# Patient Record
Sex: FEMALE | Race: BLACK OR AFRICAN AMERICAN | NOT HISPANIC OR LATINO | URBAN - METROPOLITAN AREA
[De-identification: names, ages, dates, MRNs, and addresses within clinical notes are randomized per-mention and may not be internally consistent; named-entity substitution may affect disease eponyms.]

---

## 2017-04-23 ENCOUNTER — EMERGENCY (EMERGENCY)
Facility: HOSPITAL | Age: 47
LOS: 1 days | Discharge: HOME | End: 2017-04-23
Attending: EMERGENCY MEDICINE
Payer: COMMERCIAL

## 2017-04-23 VITALS
HEART RATE: 70 BPM | SYSTOLIC BLOOD PRESSURE: 106 MMHG | TEMPERATURE: 99 F | RESPIRATION RATE: 16 BRPM | DIASTOLIC BLOOD PRESSURE: 68 MMHG | OXYGEN SATURATION: 99 %

## 2017-04-23 VITALS
HEART RATE: 82 BPM | DIASTOLIC BLOOD PRESSURE: 62 MMHG | SYSTOLIC BLOOD PRESSURE: 115 MMHG | WEIGHT: 164.91 LBS | TEMPERATURE: 99 F | HEIGHT: 59 IN | RESPIRATION RATE: 18 BRPM | OXYGEN SATURATION: 100 %

## 2017-04-23 LAB
ALBUMIN SERPL ELPH-MCNC: 3.4 G/DL — LOW (ref 3.5–5)
ALP SERPL-CCNC: 57 U/L — SIGNIFICANT CHANGE UP (ref 40–120)
ALT FLD-CCNC: 20 U/L DA — SIGNIFICANT CHANGE UP (ref 10–60)
ANION GAP SERPL CALC-SCNC: 7 MMOL/L — SIGNIFICANT CHANGE UP (ref 5–17)
AST SERPL-CCNC: 17 U/L — SIGNIFICANT CHANGE UP (ref 10–40)
BASOPHILS # BLD AUTO: 0.1 K/UL — SIGNIFICANT CHANGE UP (ref 0–0.2)
BASOPHILS NFR BLD AUTO: 1.7 % — SIGNIFICANT CHANGE UP (ref 0–2)
BILIRUB SERPL-MCNC: 0.2 MG/DL — SIGNIFICANT CHANGE UP (ref 0.2–1.2)
BUN SERPL-MCNC: 11 MG/DL — SIGNIFICANT CHANGE UP (ref 7–18)
CALCIUM SERPL-MCNC: 8 MG/DL — LOW (ref 8.4–10.5)
CHLORIDE SERPL-SCNC: 107 MMOL/L — SIGNIFICANT CHANGE UP (ref 96–108)
CHOLEST SERPL-MCNC: 205 MG/DL — HIGH (ref 10–199)
CK MB BLD-MCNC: <0.5 % — SIGNIFICANT CHANGE UP (ref 0–3.5)
CK MB BLD-MCNC: <0.5 % — SIGNIFICANT CHANGE UP (ref 0–3.5)
CK MB CFR SERPL CALC: <1 NG/ML — SIGNIFICANT CHANGE UP (ref 0–3.6)
CK MB CFR SERPL CALC: <1 NG/ML — SIGNIFICANT CHANGE UP (ref 0–3.6)
CK SERPL-CCNC: 204 U/L — SIGNIFICANT CHANGE UP (ref 21–215)
CK SERPL-CCNC: 205 U/L — SIGNIFICANT CHANGE UP (ref 21–215)
CO2 SERPL-SCNC: 25 MMOL/L — SIGNIFICANT CHANGE UP (ref 22–31)
CREAT SERPL-MCNC: 0.82 MG/DL — SIGNIFICANT CHANGE UP (ref 0.5–1.3)
EOSINOPHIL # BLD AUTO: 0.3 K/UL — SIGNIFICANT CHANGE UP (ref 0–0.5)
EOSINOPHIL NFR BLD AUTO: 5 % — SIGNIFICANT CHANGE UP (ref 0–6)
GLUCOSE SERPL-MCNC: 98 MG/DL — SIGNIFICANT CHANGE UP (ref 70–99)
HBA1C BLD-MCNC: 5.4 % — SIGNIFICANT CHANGE UP (ref 4–5.6)
HCT VFR BLD CALC: 36.4 % — SIGNIFICANT CHANGE UP (ref 34.5–45)
HGB BLD-MCNC: 11.7 G/DL — SIGNIFICANT CHANGE UP (ref 11.5–15.5)
LYMPHOCYTES # BLD AUTO: 2.9 K/UL — SIGNIFICANT CHANGE UP (ref 1–3.3)
LYMPHOCYTES # BLD AUTO: 43.6 % — SIGNIFICANT CHANGE UP (ref 13–44)
MCHC RBC-ENTMCNC: 28.5 PG — SIGNIFICANT CHANGE UP (ref 27–34)
MCHC RBC-ENTMCNC: 32.2 GM/DL — SIGNIFICANT CHANGE UP (ref 32–36)
MCV RBC AUTO: 88.3 FL — SIGNIFICANT CHANGE UP (ref 80–100)
MONOCYTES # BLD AUTO: 0.5 K/UL — SIGNIFICANT CHANGE UP (ref 0–0.9)
MONOCYTES NFR BLD AUTO: 7.5 % — SIGNIFICANT CHANGE UP (ref 2–14)
NEUTROPHILS # BLD AUTO: 2.8 K/UL — SIGNIFICANT CHANGE UP (ref 1.8–7.4)
NEUTROPHILS NFR BLD AUTO: 42.2 % — LOW (ref 43–77)
PLATELET # BLD AUTO: 338 K/UL — SIGNIFICANT CHANGE UP (ref 150–400)
POTASSIUM SERPL-MCNC: 4.2 MMOL/L — SIGNIFICANT CHANGE UP (ref 3.5–5.3)
POTASSIUM SERPL-SCNC: 4.2 MMOL/L — SIGNIFICANT CHANGE UP (ref 3.5–5.3)
PROT SERPL-MCNC: 7 G/DL — SIGNIFICANT CHANGE UP (ref 6–8.3)
RBC # BLD: 4.12 M/UL — SIGNIFICANT CHANGE UP (ref 3.8–5.2)
RBC # FLD: 11 % — SIGNIFICANT CHANGE UP (ref 10.3–14.5)
SODIUM SERPL-SCNC: 139 MMOL/L — SIGNIFICANT CHANGE UP (ref 135–145)
TROPONIN I SERPL-MCNC: <0.015 NG/ML — SIGNIFICANT CHANGE UP (ref 0–0.04)
TROPONIN I SERPL-MCNC: <0.015 NG/ML — SIGNIFICANT CHANGE UP (ref 0–0.04)
WBC # BLD: 6.8 K/UL — SIGNIFICANT CHANGE UP (ref 3.8–10.5)
WBC # FLD AUTO: 6.8 K/UL — SIGNIFICANT CHANGE UP (ref 3.8–10.5)

## 2017-04-23 PROCEDURE — 99284 EMERGENCY DEPT VISIT MOD MDM: CPT

## 2017-04-23 PROCEDURE — 71046 X-RAY EXAM CHEST 2 VIEWS: CPT

## 2017-04-23 PROCEDURE — 82465 ASSAY BLD/SERUM CHOLESTEROL: CPT

## 2017-04-23 PROCEDURE — 80053 COMPREHEN METABOLIC PANEL: CPT

## 2017-04-23 PROCEDURE — 85027 COMPLETE CBC AUTOMATED: CPT

## 2017-04-23 PROCEDURE — 93005 ELECTROCARDIOGRAM TRACING: CPT

## 2017-04-23 PROCEDURE — 83036 HEMOGLOBIN GLYCOSYLATED A1C: CPT

## 2017-04-23 PROCEDURE — 82550 ASSAY OF CK (CPK): CPT

## 2017-04-23 PROCEDURE — 84484 ASSAY OF TROPONIN QUANT: CPT

## 2017-04-23 PROCEDURE — 99284 EMERGENCY DEPT VISIT MOD MDM: CPT | Mod: 25

## 2017-04-23 PROCEDURE — 82553 CREATINE MB FRACTION: CPT

## 2017-04-23 PROCEDURE — 71020: CPT | Mod: 26

## 2017-04-23 RX ORDER — CLOPIDOGREL BISULFATE 75 MG/1
75 TABLET, FILM COATED ORAL DAILY
Qty: 0 | Refills: 0 | Status: DISCONTINUED | OUTPATIENT
Start: 2017-04-23 | End: 2017-04-27

## 2017-04-23 RX ORDER — NITROGLYCERIN 6.5 MG
0.4 CAPSULE, EXTENDED RELEASE ORAL ONCE
Qty: 0 | Refills: 0 | Status: COMPLETED | OUTPATIENT
Start: 2017-04-23 | End: 2017-04-23

## 2017-04-23 RX ADMIN — CLOPIDOGREL BISULFATE 75 MILLIGRAM(S): 75 TABLET, FILM COATED ORAL at 17:33

## 2017-04-23 RX ADMIN — Medication 0.4 MILLIGRAM(S): at 17:33

## 2017-04-23 NOTE — ED PROVIDER NOTE - NS ED MD SCRIBE ATTENDING SCRIBE SECTIONS
REVIEW OF SYSTEMS/HIV/VITAL SIGNS( Pullset)/PHYSICAL EXAM/RESULTS/HISTORY OF PRESENT ILLNESS/DISPOSITION/PAST MEDICAL/SURGICAL/SOCIAL HISTORY

## 2017-04-23 NOTE — ED PROVIDER NOTE - MEDICAL DECISION MAKING DETAILS
45 y/o F pt with very low risk chest pain x3 days. Will do two sets of cardiac enzymes, d/c for outpatient stress.

## 2017-04-23 NOTE — ED ADULT NURSE NOTE - OBJECTIVE STATEMENT
Pt states that she has been having chest pain for the past 3 days. The pain is back and has been persistent for the past 3 hours, pt denies sob, numbness or tingling on extremities, or back pain.

## 2017-04-23 NOTE — ED PROVIDER NOTE - MUSCULOSKELETAL, MLM
Spine appears normal, range of motion is not limited, no muscle or joint tenderness. No lower extremity swelling.

## 2017-04-23 NOTE — ED PROVIDER NOTE - OBJECTIVE STATEMENT
45 y/o F pt with no PMHx and no PSHx presents to ED c/o intermittent L-sided CP (non-radiating; not associated with deep inspiration, eating, exertion, or position) with associated mild lightheadedness x3 days. Pt denies SOB, nausea, diaphoresis, lower extremity swelling, hemoptysis, fever, chills, cough, or any other complaints. Pt also denies taking oral contraceptives, and denies PMHx or FHx of PE, DVT, CAD, HTN, smoking, or drug use/abuse. Allergies: Motrin (rash).

## 2017-04-23 NOTE — ED PROVIDER NOTE - CHPI ED SYMPTOMS NEG
no diaphoresis/no fever/no nausea/no chills/no shortness of breath/no cough/no lower extremity swelling, no hemoptysis

## 2017-04-27 DIAGNOSIS — R07.9 CHEST PAIN, UNSPECIFIED: ICD-10-CM

## 2017-06-09 ENCOUNTER — APPOINTMENT (OUTPATIENT)
Dept: HUMAN REPRODUCTION | Facility: CLINIC | Age: 47
End: 2017-06-09

## 2017-07-13 ENCOUNTER — APPOINTMENT (OUTPATIENT)
Dept: HUMAN REPRODUCTION | Facility: CLINIC | Age: 47
End: 2017-07-13

## 2017-07-19 ENCOUNTER — OUTPATIENT (OUTPATIENT)
Dept: OUTPATIENT SERVICES | Facility: HOSPITAL | Age: 47
LOS: 1 days | Discharge: HOME | End: 2017-07-19
Payer: COMMERCIAL

## 2017-07-19 ENCOUNTER — APPOINTMENT (OUTPATIENT)
Dept: HUMAN REPRODUCTION | Facility: CLINIC | Age: 47
End: 2017-07-19

## 2017-07-19 ENCOUNTER — RESULT REVIEW (OUTPATIENT)
Age: 47
End: 2017-07-19

## 2017-07-19 VITALS
HEART RATE: 75 BPM | SYSTOLIC BLOOD PRESSURE: 103 MMHG | TEMPERATURE: 98 F | DIASTOLIC BLOOD PRESSURE: 70 MMHG | OXYGEN SATURATION: 100 % | RESPIRATION RATE: 15 BRPM

## 2017-07-19 VITALS
SYSTOLIC BLOOD PRESSURE: 100 MMHG | HEART RATE: 65 BPM | RESPIRATION RATE: 16 BRPM | TEMPERATURE: 97 F | WEIGHT: 164.91 LBS | HEIGHT: 59 IN | DIASTOLIC BLOOD PRESSURE: 65 MMHG | OXYGEN SATURATION: 100 %

## 2017-07-19 DIAGNOSIS — N70.11 CHRONIC SALPINGITIS: ICD-10-CM

## 2017-07-19 DIAGNOSIS — D25.9 LEIOMYOMA OF UTERUS, UNSPECIFIED: ICD-10-CM

## 2017-07-19 DIAGNOSIS — D36.9 BENIGN NEOPLASM, UNSPECIFIED SITE: Chronic | ICD-10-CM

## 2017-07-19 DIAGNOSIS — Z98.890 OTHER SPECIFIED POSTPROCEDURAL STATES: Chronic | ICD-10-CM

## 2017-07-19 DIAGNOSIS — R52 PAIN, UNSPECIFIED: ICD-10-CM

## 2017-07-19 PROBLEM — N97.9 INFERTILITY, FEMALE: Status: ACTIVE | Noted: 2017-07-19

## 2017-07-19 PROCEDURE — 58561 HYSTEROSCOPY REMOVE MYOMA: CPT

## 2017-07-19 PROCEDURE — 88305 TISSUE EXAM BY PATHOLOGIST: CPT

## 2017-07-19 RX ORDER — ONDANSETRON 8 MG/1
4 TABLET, FILM COATED ORAL ONCE
Qty: 0 | Refills: 0 | Status: DISCONTINUED | OUTPATIENT
Start: 2017-07-19 | End: 2017-07-19

## 2017-07-19 RX ORDER — OXYCODONE AND ACETAMINOPHEN 5; 325 MG/1; MG/1
5-325 TABLET ORAL
Qty: 10 | Refills: 0 | Status: ACTIVE | COMMUNITY
Start: 2017-07-19 | End: 1900-01-01

## 2017-07-19 RX ORDER — AMOXICILLIN AND CLAVULANATE POTASSIUM 875; 125 MG/1; MG/1
875-125 TABLET, COATED ORAL
Qty: 14 | Refills: 0 | Status: ACTIVE | COMMUNITY
Start: 2017-07-19 | End: 1900-01-01

## 2017-07-19 RX ORDER — ESTRADIOL 2 MG/1
2 TABLET ORAL
Qty: 56 | Refills: 3 | Status: ACTIVE | COMMUNITY
Start: 2017-07-19 | End: 1900-01-01

## 2017-07-20 PROBLEM — D25.9 LEIOMYOMA OF UTERUS, UNSPECIFIED: Chronic | Status: ACTIVE | Noted: 2017-07-18

## 2017-07-23 ENCOUNTER — APPOINTMENT (OUTPATIENT)
Dept: HUMAN REPRODUCTION | Facility: CLINIC | Age: 47
End: 2017-07-23

## 2017-07-23 PROBLEM — N97.9 FEMALE INFERTILITY: Status: ACTIVE | Noted: 2017-07-23

## 2017-07-23 RX ORDER — ESTRADIOL 2 MG/1
2 TABLET ORAL
Qty: 28 | Refills: 1 | Status: ACTIVE | COMMUNITY
Start: 2017-07-23 | End: 1900-01-01

## 2017-07-24 ENCOUNTER — APPOINTMENT (OUTPATIENT)
Dept: HUMAN REPRODUCTION | Facility: CLINIC | Age: 47
End: 2017-07-24

## 2017-07-24 DIAGNOSIS — N97.9 FEMALE INFERTILITY, UNSPECIFIED: ICD-10-CM

## 2017-07-25 LAB — SURGICAL PATHOLOGY STUDY: SIGNIFICANT CHANGE UP

## 2017-08-09 ENCOUNTER — APPOINTMENT (OUTPATIENT)
Dept: HUMAN REPRODUCTION | Facility: CLINIC | Age: 47
End: 2017-08-09
Payer: COMMERCIAL

## 2017-08-09 PROCEDURE — 99211 OFF/OP EST MAY X REQ PHY/QHP: CPT | Mod: 25

## 2017-08-09 PROCEDURE — 76830 TRANSVAGINAL US NON-OB: CPT

## 2017-08-09 PROCEDURE — 36415 COLL VENOUS BLD VENIPUNCTURE: CPT

## 2017-08-09 RX ORDER — MEDROXYPROGESTERONE ACETATE 10 MG/1
10 TABLET ORAL DAILY
Qty: 7 | Refills: 0 | Status: ACTIVE | COMMUNITY
Start: 2017-08-09 | End: 1900-01-01

## 2017-08-29 ENCOUNTER — APPOINTMENT (OUTPATIENT)
Dept: HUMAN REPRODUCTION | Facility: CLINIC | Age: 47
End: 2017-08-29

## 2017-09-11 ENCOUNTER — APPOINTMENT (OUTPATIENT)
Dept: HUMAN REPRODUCTION | Facility: CLINIC | Age: 47
End: 2017-09-11
Payer: COMMERCIAL

## 2017-09-11 DIAGNOSIS — Z30.41 ENCOUNTER FOR SURVEILLANCE OF CONTRACEPTIVE PILLS: ICD-10-CM

## 2017-09-11 PROCEDURE — 99213 OFFICE O/P EST LOW 20 MIN: CPT | Mod: 25

## 2017-09-11 PROCEDURE — 76830 TRANSVAGINAL US NON-OB: CPT

## 2017-10-06 RX ORDER — DESOGESTREL AND ETHINYL ESTRADIOL 0.15-0.03
0.15-3 KIT ORAL
Qty: 3 | Refills: 0 | Status: ACTIVE | COMMUNITY
Start: 2017-09-11 | End: 1900-01-01

## 2017-10-11 ENCOUNTER — EMERGENCY (EMERGENCY)
Facility: HOSPITAL | Age: 47
LOS: 1 days | Discharge: HOME | End: 2017-10-11
Attending: EMERGENCY MEDICINE | Admitting: EMERGENCY MEDICINE
Payer: COMMERCIAL

## 2017-10-11 VITALS
HEART RATE: 79 BPM | TEMPERATURE: 98 F | RESPIRATION RATE: 17 BRPM | WEIGHT: 169.98 LBS | DIASTOLIC BLOOD PRESSURE: 72 MMHG | OXYGEN SATURATION: 100 % | SYSTOLIC BLOOD PRESSURE: 121 MMHG | HEIGHT: 59 IN

## 2017-10-11 DIAGNOSIS — D36.9 BENIGN NEOPLASM, UNSPECIFIED SITE: Chronic | ICD-10-CM

## 2017-10-11 DIAGNOSIS — Z88.6 ALLERGY STATUS TO ANALGESIC AGENT: ICD-10-CM

## 2017-10-11 DIAGNOSIS — K64.9 UNSPECIFIED HEMORRHOIDS: ICD-10-CM

## 2017-10-11 DIAGNOSIS — N93.9 ABNORMAL UTERINE AND VAGINAL BLEEDING, UNSPECIFIED: ICD-10-CM

## 2017-10-11 DIAGNOSIS — Z98.890 OTHER SPECIFIED POSTPROCEDURAL STATES: Chronic | ICD-10-CM

## 2017-10-11 PROCEDURE — 99283 EMERGENCY DEPT VISIT LOW MDM: CPT

## 2017-10-11 PROCEDURE — 99284 EMERGENCY DEPT VISIT MOD MDM: CPT

## 2017-10-11 RX ORDER — MAGNESIUM HYDROXIDE 400 MG/1
30 TABLET, CHEWABLE ORAL
Qty: 1 | Refills: 0 | OUTPATIENT
Start: 2017-10-11 | End: 2017-11-10

## 2017-10-11 RX ORDER — HYDROCORTISONE/PRAMOXINE 2.5 %-1 %
1 CREAM WITH APPLICATOR RECTAL
Qty: 1 | Refills: 0 | OUTPATIENT
Start: 2017-10-11 | End: 2017-10-25

## 2017-10-11 NOTE — ED PROVIDER NOTE - MEDICAL DECISION MAKING DETAILS
c/o tender hemorrhoid x3 days, reduces at home s/p sitz bath, pt has not had BM x4 days, not thrombosed, denies rectal pain, or not passing gas; discussed continuing sitz baths, stool softeners, using donut, scheduled IBU and rx cream with surgery follow up for removal as needed. Pt not acutely concerned about vaginal bleeding stated in triage as it is chronic and managed by gynecology. Will dc home with general surgery references.

## 2017-10-11 NOTE — ED PROVIDER NOTE - ATTENDING CONTRIBUTION TO CARE
Agree with NP Corine's assessment and plan. Pt instructed to f/u in Surgery clinic. Given MOM and proctofoam for comfort.

## 2017-10-11 NOTE — ED ADULT NURSE NOTE - OBJECTIVE STATEMENT
C/O HEMORRHOIDS C/O PAINFUL HEMORRHOIDS SINCE YESTERDAY. SEEN AND EXAMINED BY RYAN MOON. WITH EXTERNAL HEMORRHOIDS NON TROMBOSED.CASE DISCUSSED BY RYAN MOON WITH PATIENT

## 2017-10-11 NOTE — ED PROVIDER NOTE - OBJECTIVE STATEMENT
48 y/o female, pmhx hemorrhoids, uterine fibroids c/o painful hemorrhoid x3 days, hard stools (last BM x4 days ago) and vaginal bleeding (x1 pad per day) x2 mos s/p fibroid robotic surgery 7/2017. Pt denies abdominal pain, pelvic pain, dysuria, hematuria, flank pain, rectal bleeding, or any other concerns. Confirms gynecology follow-up 9/17 and good follow-up. Pt is not taking stool softeners, using OTC prep-H with minimal relief, states sitz baths reduces hemorrhoid but the hemorrhoid later returns.

## 2017-11-30 ENCOUNTER — APPOINTMENT (OUTPATIENT)
Dept: HUMAN REPRODUCTION | Facility: CLINIC | Age: 47
End: 2017-11-30
Payer: COMMERCIAL

## 2017-11-30 PROCEDURE — 99213 OFFICE O/P EST LOW 20 MIN: CPT | Mod: 25

## 2017-11-30 PROCEDURE — 76830 TRANSVAGINAL US NON-OB: CPT

## 2017-12-07 ENCOUNTER — APPOINTMENT (OUTPATIENT)
Dept: HUMAN REPRODUCTION | Facility: CLINIC | Age: 47
End: 2017-12-07

## 2017-12-13 ENCOUNTER — APPOINTMENT (OUTPATIENT)
Dept: HUMAN REPRODUCTION | Facility: CLINIC | Age: 47
End: 2017-12-13

## 2018-02-27 ENCOUNTER — EMERGENCY (EMERGENCY)
Facility: HOSPITAL | Age: 48
LOS: 1 days | Discharge: HOME | End: 2018-02-27
Attending: EMERGENCY MEDICINE | Admitting: INTERNAL MEDICINE
Payer: COMMERCIAL

## 2018-02-27 VITALS
HEART RATE: 72 BPM | OXYGEN SATURATION: 100 % | SYSTOLIC BLOOD PRESSURE: 112 MMHG | TEMPERATURE: 98 F | DIASTOLIC BLOOD PRESSURE: 70 MMHG | RESPIRATION RATE: 16 BRPM

## 2018-02-27 DIAGNOSIS — Z98.890 OTHER SPECIFIED POSTPROCEDURAL STATES: Chronic | ICD-10-CM

## 2018-02-27 DIAGNOSIS — D36.9 BENIGN NEOPLASM, UNSPECIFIED SITE: Chronic | ICD-10-CM

## 2018-02-27 DIAGNOSIS — R07.9 CHEST PAIN, UNSPECIFIED: ICD-10-CM

## 2018-02-27 LAB
ALBUMIN SERPL ELPH-MCNC: 4.2 G/DL — SIGNIFICANT CHANGE UP (ref 3.3–5)
ALP SERPL-CCNC: 50 U/L — SIGNIFICANT CHANGE UP (ref 40–120)
ALT FLD-CCNC: 11 U/L — SIGNIFICANT CHANGE UP (ref 4–33)
APTT BLD: 32 SEC — SIGNIFICANT CHANGE UP (ref 27.5–37.4)
AST SERPL-CCNC: 16 U/L — SIGNIFICANT CHANGE UP (ref 4–32)
BASOPHILS # BLD AUTO: 0.05 K/UL — SIGNIFICANT CHANGE UP (ref 0–0.2)
BASOPHILS NFR BLD AUTO: 0.6 % — SIGNIFICANT CHANGE UP (ref 0–2)
BILIRUB SERPL-MCNC: 0.3 MG/DL — SIGNIFICANT CHANGE UP (ref 0.2–1.2)
BUN SERPL-MCNC: 10 MG/DL — SIGNIFICANT CHANGE UP (ref 7–23)
CALCIUM SERPL-MCNC: 8.6 MG/DL — SIGNIFICANT CHANGE UP (ref 8.4–10.5)
CHLORIDE SERPL-SCNC: 102 MMOL/L — SIGNIFICANT CHANGE UP (ref 98–107)
CK MB BLD-MCNC: 1.2 — SIGNIFICANT CHANGE UP (ref 0–2.5)
CK MB BLD-MCNC: 2.44 NG/ML — SIGNIFICANT CHANGE UP (ref 1–4.7)
CK SERPL-CCNC: 196 U/L — HIGH (ref 25–170)
CO2 SERPL-SCNC: 25 MMOL/L — SIGNIFICANT CHANGE UP (ref 22–31)
CREAT SERPL-MCNC: 0.87 MG/DL — SIGNIFICANT CHANGE UP (ref 0.5–1.3)
D DIMER BLD IA.RAPID-MCNC: < 150 NG/ML — SIGNIFICANT CHANGE UP
EOSINOPHIL # BLD AUTO: 0.42 K/UL — SIGNIFICANT CHANGE UP (ref 0–0.5)
EOSINOPHIL NFR BLD AUTO: 5.2 % — SIGNIFICANT CHANGE UP (ref 0–6)
GLUCOSE SERPL-MCNC: 87 MG/DL — SIGNIFICANT CHANGE UP (ref 70–99)
HCG SERPL-ACNC: < 5 MIU/ML — SIGNIFICANT CHANGE UP
HCT VFR BLD CALC: 37 % — SIGNIFICANT CHANGE UP (ref 34.5–45)
HGB BLD-MCNC: 12.3 G/DL — SIGNIFICANT CHANGE UP (ref 11.5–15.5)
IMM GRANULOCYTES # BLD AUTO: 0.02 # — SIGNIFICANT CHANGE UP
IMM GRANULOCYTES NFR BLD AUTO: 0.2 % — SIGNIFICANT CHANGE UP (ref 0–1.5)
INR BLD: 0.95 — SIGNIFICANT CHANGE UP (ref 0.88–1.17)
LYMPHOCYTES # BLD AUTO: 4.02 K/UL — HIGH (ref 1–3.3)
LYMPHOCYTES # BLD AUTO: 49.8 % — HIGH (ref 13–44)
MCHC RBC-ENTMCNC: 29.5 PG — SIGNIFICANT CHANGE UP (ref 27–34)
MCHC RBC-ENTMCNC: 33.2 % — SIGNIFICANT CHANGE UP (ref 32–36)
MCV RBC AUTO: 88.7 FL — SIGNIFICANT CHANGE UP (ref 80–100)
MONOCYTES # BLD AUTO: 0.64 K/UL — SIGNIFICANT CHANGE UP (ref 0–0.9)
MONOCYTES NFR BLD AUTO: 7.9 % — SIGNIFICANT CHANGE UP (ref 2–14)
NEUTROPHILS # BLD AUTO: 2.93 K/UL — SIGNIFICANT CHANGE UP (ref 1.8–7.4)
NEUTROPHILS NFR BLD AUTO: 36.3 % — LOW (ref 43–77)
NRBC # FLD: 0 — SIGNIFICANT CHANGE UP
PLATELET # BLD AUTO: 319 K/UL — SIGNIFICANT CHANGE UP (ref 150–400)
PMV BLD: 9.8 FL — SIGNIFICANT CHANGE UP (ref 7–13)
POTASSIUM SERPL-MCNC: 4.4 MMOL/L — SIGNIFICANT CHANGE UP (ref 3.5–5.3)
POTASSIUM SERPL-SCNC: 4.4 MMOL/L — SIGNIFICANT CHANGE UP (ref 3.5–5.3)
PROT SERPL-MCNC: 7 G/DL — SIGNIFICANT CHANGE UP (ref 6–8.3)
PROTHROM AB SERPL-ACNC: 10.9 SEC — SIGNIFICANT CHANGE UP (ref 9.8–13.1)
RBC # BLD: 4.17 M/UL — SIGNIFICANT CHANGE UP (ref 3.8–5.2)
RBC # FLD: 11.7 % — SIGNIFICANT CHANGE UP (ref 10.3–14.5)
SODIUM SERPL-SCNC: 140 MMOL/L — SIGNIFICANT CHANGE UP (ref 135–145)
TROPONIN T SERPL-MCNC: < 0.06 NG/ML — SIGNIFICANT CHANGE UP (ref 0–0.06)
WBC # BLD: 8.08 K/UL — SIGNIFICANT CHANGE UP (ref 3.8–10.5)
WBC # FLD AUTO: 8.08 K/UL — SIGNIFICANT CHANGE UP (ref 3.8–10.5)

## 2018-02-27 PROCEDURE — 93010 ELECTROCARDIOGRAM REPORT: CPT

## 2018-02-27 PROCEDURE — 99285 EMERGENCY DEPT VISIT HI MDM: CPT | Mod: 25

## 2018-02-27 NOTE — ED PROVIDER NOTE - OBJECTIVE STATEMENT
47yF w/pmhx uterine fibroids sent in by cardiologist  for chest pain. Pt states her chest pain first began one week ago after taking diet pills phentermine (3 doses, once every other day). Pt states the chest pain is left sided, described as a tightness, constant, no relieving factors. +lightheadedness and exertional component to chest pain began today. Pt was seen by  in his office today, normal appearing bedside US, sent in for cardiac workup. Pt flew to [place] at the end of January. Pt denies shortness of breath, abd pain, n/v/d, headache, hx of cardiac disease, family hx of cardiac disease, leg pain or swelling, OCP use or any other concerns.  Pt with similar hx of chest pain after diet pills in May 2017, she had a stress test performed at that time which was normal.

## 2018-02-27 NOTE — H&P ADULT - NSHPLABSRESULTS_GEN_ALL_CORE
12.3   8.08  )-----------( 319      ( 27 Feb 2018 20:30 )             37.0     EKG: NSR Flat TIn V1, 66 BPM    02-27    140  |  102  |  10  ----------------------------<  87  4.4   |  25  |  0.87    Ca    8.6      27 Feb 2018 21:02    TPro  7.0  /  Alb  4.2  /  TBili  0.3  /  DBili  x   /  AST  16  /  ALT  11  /  AlkPhos  50  02-27    CARDIAC MARKERS ( 28 Feb 2018 03:00 )  x     / < 0.06 ng/mL / 179 u/L / 1.91 ng/mL / x      CARDIAC MARKERS ( 27 Feb 2018 21:02 )  x     / < 0.06 ng/mL / 196 u/L / 2.44 ng/mL / x

## 2018-02-27 NOTE — H&P ADULT - HISTORY OF PRESENT ILLNESS
48 y/o F with hx of uterine fibroids presents with chest pain x 1 week. Chest pain is intermittent, non radiating, and has been worsening. She was seen at Dr. Nieves's office and was sent ot ED for further eval. Chest pain started when she started taking diets pills: phentermine which she stopped after 3 doses because she developed chest pain. She cannot recall what makes the pain better or worse. Denies fever, chills, cough, falls, LOC, SOB ,abdominal pain, nausea, vomiting, melena, hematochezia, LE edema, calf tenderness or dysuria.

## 2018-02-27 NOTE — H&P ADULT - ATTENDING COMMENTS
Patient seen and examined on 02/27/18.   -48 yo F admitted with chest pain  -Admit to tele  -JOSE with 3 sets CE  -check d-dimer  -rule out PE if d-dimer positive  -if d-dimer negative, check NST in am    Steven Ortiz MD  [facility]  [address] [facility], [address]  [place] [address]  office: [phone]  pager: [phone]

## 2018-02-27 NOTE — ED PROVIDER NOTE - ATTENDING CONTRIBUTION TO CARE
I was physically present for the E/M service provided. I agree with above history, physical, and plan which I have reviewed and edited where appropriate. I was physically present for the key portions of the service provided.    Attending Exam - Dr. Sosa: GEN: well appearing, NAD  HEENT: +PERRL, EOMI  RESP: CTAB, no signs of respiratory distress CV: s1s2 RRR ABD: soft/non tender/non distended  MSK: no deformities / swelling, normal range of motion, spine grossly normal NEURO: alert, non focal exam SKIN: normal color / temperature / condition.    Attending MDM: will check labs including CE, d-dimer, if dimer is positive will check CTA, discuss with cardiology regarding admission.

## 2018-02-27 NOTE — ED ADULT TRIAGE NOTE - CHIEF COMPLAINT QUOTE
c/o chest pain with left arm tingling since last night, pain worsened this A.M., saw PMD today and was instructed to go to ED for evaluation.  Patient taking diet pills for weight loss.  Denies any SOB. PMH: fibroids

## 2018-02-27 NOTE — ED PROVIDER NOTE - PROGRESS NOTE DETAILS
HILARIO Marie: Spoke with , sent in from his office, would like pt to have a d-dimer to r/o PE and cardiac enzymes, is planning to admit for stress tomorrow. If d-dimer positive will CTa chest HILARIO Marie: Spoke with , sent in from his office, would like pt to have a d-dimer to r/o PE and cardiac enzymes, is planning to admit for stress tomorrow. If d-dimer positive will CTa chest otherwise can admit to tele under iLzb[facility]

## 2018-02-28 ENCOUNTER — TRANSCRIPTION ENCOUNTER (OUTPATIENT)
Age: 48
End: 2018-02-28

## 2018-02-28 VITALS
TEMPERATURE: 98 F | SYSTOLIC BLOOD PRESSURE: 110 MMHG | DIASTOLIC BLOOD PRESSURE: 62 MMHG | RESPIRATION RATE: 17 BRPM | OXYGEN SATURATION: 98 % | HEART RATE: 82 BPM

## 2018-02-28 DIAGNOSIS — Z29.9 ENCOUNTER FOR PROPHYLACTIC MEASURES, UNSPECIFIED: ICD-10-CM

## 2018-02-28 DIAGNOSIS — R07.9 CHEST PAIN, UNSPECIFIED: ICD-10-CM

## 2018-02-28 LAB
BASOPHILS # BLD AUTO: 0.06 K/UL — SIGNIFICANT CHANGE UP (ref 0–0.2)
BASOPHILS NFR BLD AUTO: 1.2 % — SIGNIFICANT CHANGE UP (ref 0–2)
BUN SERPL-MCNC: 11 MG/DL — SIGNIFICANT CHANGE UP (ref 7–23)
CALCIUM SERPL-MCNC: 8.5 MG/DL — SIGNIFICANT CHANGE UP (ref 8.4–10.5)
CHLORIDE SERPL-SCNC: 102 MMOL/L — SIGNIFICANT CHANGE UP (ref 98–107)
CHOLEST SERPL-MCNC: 206 MG/DL — HIGH (ref 120–199)
CK MB BLD-MCNC: 1.1 — SIGNIFICANT CHANGE UP (ref 0–2.5)
CK MB BLD-MCNC: 1.9 NG/ML — SIGNIFICANT CHANGE UP (ref 1–4.7)
CK MB BLD-MCNC: 1.91 NG/ML — SIGNIFICANT CHANGE UP (ref 1–4.7)
CK SERPL-CCNC: 179 U/L — HIGH (ref 25–170)
CK SERPL-CCNC: 180 U/L — HIGH (ref 25–170)
CO2 SERPL-SCNC: 22 MMOL/L — SIGNIFICANT CHANGE UP (ref 22–31)
CREAT SERPL-MCNC: 0.81 MG/DL — SIGNIFICANT CHANGE UP (ref 0.5–1.3)
EOSINOPHIL # BLD AUTO: 0.24 K/UL — SIGNIFICANT CHANGE UP (ref 0–0.5)
EOSINOPHIL NFR BLD AUTO: 4.7 % — SIGNIFICANT CHANGE UP (ref 0–6)
GLUCOSE SERPL-MCNC: 86 MG/DL — SIGNIFICANT CHANGE UP (ref 70–99)
HBA1C BLD-MCNC: 5.5 % — SIGNIFICANT CHANGE UP (ref 4–5.6)
HCG SERPL-ACNC: < 5 MIU/ML — SIGNIFICANT CHANGE UP
HCT VFR BLD CALC: 37.3 % — SIGNIFICANT CHANGE UP (ref 34.5–45)
HDLC SERPL-MCNC: 84 MG/DL — HIGH (ref 45–65)
HGB BLD-MCNC: 12.3 G/DL — SIGNIFICANT CHANGE UP (ref 11.5–15.5)
IMM GRANULOCYTES # BLD AUTO: 0.01 # — SIGNIFICANT CHANGE UP
IMM GRANULOCYTES NFR BLD AUTO: 0.2 % — SIGNIFICANT CHANGE UP (ref 0–1.5)
LIPID PNL WITH DIRECT LDL SERPL: 122 MG/DL — SIGNIFICANT CHANGE UP
LYMPHOCYTES # BLD AUTO: 2.42 K/UL — SIGNIFICANT CHANGE UP (ref 1–3.3)
LYMPHOCYTES # BLD AUTO: 47.8 % — HIGH (ref 13–44)
MAGNESIUM SERPL-MCNC: 2.2 MG/DL — SIGNIFICANT CHANGE UP (ref 1.6–2.6)
MCHC RBC-ENTMCNC: 29 PG — SIGNIFICANT CHANGE UP (ref 27–34)
MCHC RBC-ENTMCNC: 33 % — SIGNIFICANT CHANGE UP (ref 32–36)
MCV RBC AUTO: 88 FL — SIGNIFICANT CHANGE UP (ref 80–100)
MONOCYTES # BLD AUTO: 0.35 K/UL — SIGNIFICANT CHANGE UP (ref 0–0.9)
MONOCYTES NFR BLD AUTO: 6.9 % — SIGNIFICANT CHANGE UP (ref 2–14)
NEUTROPHILS # BLD AUTO: 1.98 K/UL — SIGNIFICANT CHANGE UP (ref 1.8–7.4)
NEUTROPHILS NFR BLD AUTO: 39.2 % — LOW (ref 43–77)
NRBC # FLD: 0 — SIGNIFICANT CHANGE UP
PHOSPHATE SERPL-MCNC: 2.9 MG/DL — SIGNIFICANT CHANGE UP (ref 2.5–4.5)
PLATELET # BLD AUTO: 295 K/UL — SIGNIFICANT CHANGE UP (ref 150–400)
PMV BLD: 9.5 FL — SIGNIFICANT CHANGE UP (ref 7–13)
POTASSIUM SERPL-MCNC: 4.7 MMOL/L — SIGNIFICANT CHANGE UP (ref 3.5–5.3)
POTASSIUM SERPL-SCNC: 4.7 MMOL/L — SIGNIFICANT CHANGE UP (ref 3.5–5.3)
RBC # BLD: 4.24 M/UL — SIGNIFICANT CHANGE UP (ref 3.8–5.2)
RBC # FLD: 11.8 % — SIGNIFICANT CHANGE UP (ref 10.3–14.5)
SODIUM SERPL-SCNC: 139 MMOL/L — SIGNIFICANT CHANGE UP (ref 135–145)
TRIGL SERPL-MCNC: 52 MG/DL — SIGNIFICANT CHANGE UP (ref 10–149)
TROPONIN T SERPL-MCNC: < 0.06 NG/ML — SIGNIFICANT CHANGE UP (ref 0–0.06)
TROPONIN T SERPL-MCNC: < 0.06 NG/ML — SIGNIFICANT CHANGE UP (ref 0–0.06)
TSH SERPL-MCNC: 0.88 UIU/ML — SIGNIFICANT CHANGE UP (ref 0.27–4.2)
WBC # BLD: 5.06 K/UL — SIGNIFICANT CHANGE UP (ref 3.8–10.5)
WBC # FLD AUTO: 5.06 K/UL — SIGNIFICANT CHANGE UP (ref 3.8–10.5)

## 2018-02-28 RX ORDER — SODIUM CHLORIDE 9 MG/ML
3 INJECTION INTRAMUSCULAR; INTRAVENOUS; SUBCUTANEOUS EVERY 8 HOURS
Qty: 0 | Refills: 0 | Status: DISCONTINUED | OUTPATIENT
Start: 2018-02-28 | End: 2018-02-27

## 2018-02-28 RX ADMIN — SODIUM CHLORIDE 3 MILLILITER(S): 9 INJECTION INTRAMUSCULAR; INTRAVENOUS; SUBCUTANEOUS at 08:00

## 2018-02-28 NOTE — DISCHARGE NOTE ADULT - CARE PLAN
Principal Discharge DX:	Atypical chest pain  Goal:	Your stress test was negative. Your chest pain is not cardiac.  Assessment and plan of treatment:	Follow up with your PCP and/or cardiologist for further care. Return to ED for any concerning symptoms.

## 2018-02-28 NOTE — CONSULT NOTE ADULT - ASSESSMENT
46 y/o F with hx of uterine fibroids presents with chest pain x 1 week.    Problem/Plan - 1:  ·  Problem: Chest pain.  Plan: telemonitor  check CE  cards fu  ischemia moreno as per cards    Problem/Plan - 2:  ·  Problem: Need for prophylactic measure.  Plan: LE stockings.     dc planning as per primary team

## 2018-02-28 NOTE — CONSULT NOTE ADULT - SUBJECTIVE AND OBJECTIVE BOX
Requesting Physician : Dr. Hagen    Reason for Consultation: chest pain/CAD    HISTORY OF PRESENT ILLNESS: HPI: 48 year old with pmhx of fibroids admitted to hospital with one day history of progessive dyspnea/chest pain radiating to left shoulder.  patient descirbes pain as being intermittent/associated with dyspnea/worse with exertion/ occassionally with palpation.  denies any palpitations/near synocpal episodes.  patient states that she has been taking fen-fen past few days prior to onset of chest pain.        PAST MEDICAL & SURGICAL HISTORY:  Fibroids  Dermoid cyst: left ovary 1998  History of myomectomy          MEDICATIONS:  MEDICATIONS  (STANDING):      Allergies    Motrin (Rash)    Intolerances        FAMILY HISTORY:    Non-contributary for premature coronary disease or sudden cardiac death    SOCIAL HISTORY:    [y Non-smoker  [ ] Smoker  [o ] Alcohol      REVIEW OF SYSTEMS:  [y ]chest pain  [y  ]shortness of breath  [ n ]palpitations  [n  ]syncope  [ n]near syncope [y ]upper extremity weakness   [ n] lower extremity weakness  [n  ]diplopia  [  n]altered mental status         [ ] All others negative	  [ ] Unable to obtain    PHYSICAL EXAM:  T(C): 36.8 (02-27-18 @ 21:32), Max: 36.8 (02-27-18 @ 21:32)  HR: 74 (02-27-18 @ 21:32) (72 - 74)  BP: 100/60 (02-27-18 @ 21:32) (100/60 - 112/70)  RR: 16 (02-27-18 @ 21:32) (16 - 16)  SpO2: 100% (02-27-18 @ 21:32) (100% - 100%)  Wt(kg): --  I&O's Summary        HEENT:   Normal oral mucosa, PERRL, EOMI	  Lymphatic: No obvious lymphadenopathy , no edema  Cardiovascular: Normal S1 S2, No JVD,  1/6 CANDICE murmur , Peripheral pulses palpable 2+ bilaterally  Respiratory: Lungs clear to auscultation, normal effort 	  Gastrointestinal:  Soft, Non-tender, + BS	  Skin: No rashes, No cyanosis, warm to touch  Musculoskeletal: Normal range of motion, normal strength  Psychiatry:  Appropriate Mood & affect     TELEMETRY: 	NSR    ECG:  	NSR /nonspeicific st/t wave changes    	  LABS:	 	    CARDIAC MARKERS:  CARDIAC MARKERS ( 27 Feb 2018 21:02 )  x     / < 0.06 ng/mL / 196 u/L / 2.44 ng/mL / x                                  12.3   8.08  )-----------( 319      ( 27 Feb 2018 20:30 )             37.0     Hb Trend: 12.3<--  02-27    140  |  102  |  10  ----------------------------<  87  4.4   |  25  |  0.87    Ca    8.6      27 Feb 2018 21:02    TPro  7.0  /  Alb  4.2  /  TBili  0.3  /  DBili  x   /  AST  16  /  ALT  11  /  AlkPhos  50  02-27    Creatinine Trend: 0.87<--    Coags:  PT/INR - ( 27 Feb 2018 20:30 )   PT: 10.9 SEC;   INR: 0.95          PTT - ( 27 Feb 2018 20:30 )  PTT:32.0 SEC    ASSESSMENT/PLAN: 	47yFemale with pmhx of htn admitted with chest pain associated with dsypnea after taking fen-fen worse with exertion.  1) agree with tele  2) r/Ame with CE  3) d-dimer--if elevated, will need ct chest to rule out PE  4) TST if d-dimer negative and CE negative  5) no need for urgent cath  6) cath if stresss positive  7) no need for iv heparin at this time
cc chest pain  [place] 46 y/o F with hx of uterine fibroids presents with chest pain x 1 week. Chest pain is intermittent, non radiating, and has been worsening. She was seen at Dr. Nieves's office and was sent ot ED for further eval. Chest pain started when she started taking diets pills: phentermine which she stopped after 3 doses because she developed chest pain. She cannot recall what makes the pain better or worse. Denies fever, chills, cough, falls, LOC, SOB ,abdominal pain, nausea, vomiting, melena, hematochezia, LE edema, calf tenderness or dysuria.    Allegies motrin    REVIEW OF SYSTEMS:    CONSTITUTIONAL: No weakness, fevers or chills  EYES/ENT: No visual changes;  No vertigo or throat pain   NECK: No pain or stiffness  RESPIRATORY: No cough, wheezing, hemoptysis; No shortness of breath  CARDIOVASCULAR: No chest pain or palpitations  GASTROINTESTINAL: No abdominal or epigastric pain. No nausea, vomiting, or hematemesis; No diarrhea or constipation. No melena or hematochezia.  GENITOURINARY: No dysuria, frequency or hematuria  NEUROLOGICAL: No numbness or weakness  SKIN: No itching, burning, rashes, or lesions   All other review of systems is negative unless indicated above.    Home Medications:   * Patient Currently Takes Medications as of 11-Oct-2017 14:41 documented in Structured Notes  · 	Milk of Magnesia 8% oral suspension: 30 milliliter(s) orally once a day, As Needed MDD:max 60mL/24h  · 	Proctofoam HC 1%-1% rectal foam: 1 applicatorful rectal 3 times a day  · 	predniSONE 20 mg oral tablet: 2 tab(s) orally once a day for 4 more days  · 	Tylenol:  orally     Patient History:   Past Medical History:  Fibroids.    Past Surgical History:  Dermoid cyst  left ovary 1998  History of myomectomy.    Family History:  No pertinent family history in first degree relatives.    Social History:  Social History (marital status, living situation, occupation, tobacco use, alcohol and drug use, and sexual history): Denies smoking, alcohol or drug use	      Physical exam    General: WN/WD NAD  PERRLA  Neurology: A&Ox3, nonfocal, WILKINSON x 4  Respiratory: CTA B/L  CV: RRR, S1S2, no murmurs, rubs or gallops  Abdominal: Soft, NT, ND +BS, Last BM  Extremities: No edema, + peripheral pulses  Skin Normal     Lab Results:  CBC  CBC Full  -  ( 28 Feb 2018 11:38 )  WBC Count : 5.06 K/uL  Hemoglobin : 12.3 g/dL  Hematocrit : 37.3 %  Platelet Count - Automated : 295 K/uL  Mean Cell Volume : 88.0 fL  Mean Cell Hemoglobin : 29.0 pg  Mean Cell Hemoglobin Concentration : 33.0 %  Auto Neutrophil # : 1.98 K/uL  Auto Lymphocyte # : 2.42 K/uL  Auto Monocyte # : 0.35 K/uL  Auto Eosinophil # : 0.24 K/uL  Auto Basophil # : 0.06 K/uL  Auto Neutrophil % : 39.2 %  Auto Lymphocyte % : 47.8 %  Auto Monocyte % : 6.9 %  Auto Eosinophil % : 4.7 %  Auto Basophil % : 1.2 %    .		Differential:	[] Automated		[] Manual  Chemistry                        12.3   5.06  )-----------( 295      ( 28 Feb 2018 11:38 )             37.3     02-28    139  |  102  |  11  ----------------------------<  86  4.7   |  22  |  0.81    Ca    8.5      28 Feb 2018 11:38  Phos  2.9     02-28  Mg     2.2     02-28    TPro  7.0  /  Alb  4.2  /  TBili  0.3  /  DBili  x   /  AST  16  /  ALT  11  /  AlkPhos  50  02-27    LIVER FUNCTIONS - ( 27 Feb 2018 21:02 )  Alb: 4.2 g/dL / Pro: 7.0 g/dL / ALK PHOS: 50 u/L / ALT: 11 u/L / AST: 16 u/L / GGT: x           PT/INR - ( 27 Feb 2018 20:30 )   PT: 10.9 SEC;   INR: 0.95          PTT - ( 27 Feb 2018 20:30 )  PTT:32.0 SEC          MICROBIOLOGY/CULTURES:      RADIOLOGY RESULTS: reviewed

## 2018-02-28 NOTE — DISCHARGE NOTE ADULT - CARE PROVIDER_API CALL
Steven Ortiz), Cardiovascular Disease; Internal Medicine; Interventional Cardiology  [address] [facility] [address] [place] [address]  Phone: [phone]  Fax: [phone]

## 2018-02-28 NOTE — DISCHARGE NOTE ADULT - PLAN OF CARE
Your stress test was negative. Your chest pain is not cardiac. Follow up with your PCP and/or cardiologist for further care. Return to ED for any concerning symptoms.

## 2018-02-28 NOTE — PROGRESS NOTE ADULT - SUBJECTIVE AND OBJECTIVE BOX
Subjective: No chest pain or sob   	  MEDICATIONS:  MEDICATIONS  (STANDING):  sodium chloride 0.9% lock flush 3 milliLiter(s) IV Push every 8 hours      LABS:	 	    CARDIAC MARKERS:  CARDIAC MARKERS ( 28 Feb 2018 11:38 )  x     / < 0.06 ng/mL / 180 u/L / 1.90 ng/mL / x      CARDIAC MARKERS ( 28 Feb 2018 03:00 )  x     / < 0.06 ng/mL / 179 u/L / 1.91 ng/mL / x      CARDIAC MARKERS ( 27 Feb 2018 21:02 )  x     / < 0.06 ng/mL / 196 u/L / 2.44 ng/mL / x                                    12.3   5.06  )-----------( 295      ( 28 Feb 2018 11:38 )             37.3     02-28    139  |  102  |  11  ----------------------------<  86  4.7   |  22  |  0.81    Ca    8.5      28 Feb 2018 11:38  Phos  2.9     02-28  Mg     2.2     02-28    TPro  7.0  /  Alb  4.2  /  TBili  0.3  /  DBili  x   /  AST  16  /  ALT  11  /  AlkPhos  50  02-27    proBNP:   Lipid Profile:   HgA1c: Hemoglobin A1C, Whole Blood: 5.5 % (02-28 @ 11:38)    TSH: Thyroid Stimulating Hormone, Serum: 0.88 uIU/mL (02-28 @ 11:38)        PHYSICAL EXAM:  T(C): 36.8 (02-28-18 @ 11:06), Max: 36.9 (02-28-18 @ 06:28)  HR: 82 (02-28-18 @ 11:06) (72 - 84)  BP: 110/62 (02-28-18 @ 11:06) (100/60 - 117/71)  RR: 17 (02-28-18 @ 11:06) (16 - 18)  SpO2: 98% (02-28-18 @ 11:06) (98% - 100%)  Wt(kg): --  I&O's Summary        Appearance: Normal	  HEENT:   Normal oral mucosa, PERRL, EOMI	  Lymphatic: No lymphadenopathy , no edema  Cardiovascular: Normal S1 S2, No JVD, No murmurs , Peripheral pulses palpable 2+ bilaterally  Respiratory: Lungs clear to auscultation, normal effort 	  Gastrointestinal:  Soft, Non-tender, + BS	  Skin: No rashes, No ecchymoses, No cyanosis, warm to touch  Musculoskeletal: Normal range of motion, normal strength  Psychiatry:  Mood & affect appropriate    TELEMETRY: SR	    ECG:  	  RADIOLOGY:   DIAGNOSTIC TESTING:  [ ] Echocardiogram:  [ ]  Catheterization:  [ ] Stress Test:  < from: Nuclear Stress Test-Exercise (02.28.18 @ 10:37) >  * The left ventricle was normal in size.  * Tracer uptake was homogeneous throughout the left  ventricle.  * Normal study; no evidence for myocardial infarction or  ischemia.  * Gated wallmotion analysis was performed, and shows  normal wall motion.    < end of copied text >    OTHER: 	      ASSESSMENT/PLAN: 	47y Female admitted with chest pain.   -MI ruled out  -d-dimer negative  -NST normal   -No further cardiac workup needed at this time  -dc home    Steven Ortiz MD  [facility]  [address] [facility], [address]  [place] [address]  office: [phone]  pager: [phone]

## 2018-02-28 NOTE — DISCHARGE NOTE ADULT - MEDICATION SUMMARY - MEDICATIONS TO TAKE
I will START or STAY ON the medications listed below when I get home from the hospital:    To Whom It May Concern  -- Elaine Rudd was hospitalized from 2/27/18-2/28/18. Pt should avoid close interaction with pregnant women at this timefor 48 hours.   -- Indication: For Work note

## 2018-02-28 NOTE — DISCHARGE NOTE ADULT - HOSPITAL COURSE
48 y/o female with no significant PMHx presented to ED with atypical chest pain. Pt was admitted to telemetry. EKG was normal. Cardiac enzymes were negative x 3. CXR was normal. D-dimer was negative. Pt underwent NST which was normal. Case discussed with Dr. Ortiz. Pt now medically stable for discharge home.

## 2018-02-28 NOTE — DISCHARGE NOTE ADULT - PATIENT PORTAL LINK FT
You can access the [facility] Portal, offered by [facility], by registering with the following website: [email or web address]

## 2018-02-28 NOTE — PROGRESS NOTE ADULT - SUBJECTIVE AND OBJECTIVE BOX
SUBJECTIVE: no CP or SOB    MEDICATIONS  (STANDING):  sodium chloride 0.9% lock flush 3 milliLiter(s) IV Push every 8 hours    LABS:                        12.3   5.06  )-----------( 295      ( 28 Feb 2018 11:38 )             37.3     139  |  102  |  11  ----------------------------<  86  4.7   |  22  |  0.81    Ca    8.5      28 Feb 2018 11:38  Phos  2.9     02-28  Mg     2.2     02-28    TPro  7.0  /  Alb  4.2  /  TBili  0.3  /  DBili  x   /  AST  16  /  ALT  11  /  AlkPhos  50  02-27  PT/INR - ( 27 Feb 2018 20:30 )   PT: 10.9 SEC;   INR: 0.95     PTT - ( 27 Feb 2018 20:30 )  PTT:32.0 SEC    CARDIAC MARKERS ( 28 Feb 2018 11:38 )  x     / < 0.06 ng/mL / 180 u/L / 1.90 ng/mL / x      CARDIAC MARKERS ( 28 Feb 2018 03:00 )  x     / < 0.06 ng/mL / 179 u/L / 1.91 ng/mL / x      CARDIAC MARKERS ( 27 Feb 2018 21:02 )  x     / < 0.06 ng/mL / 196 u/L / 2.44 ng/mL / x        D-Dimer Assay, Quantitative (02.27.18 @ 20:30)    D-Dimer Assay, Quantitative: < 150: A result less than 230 ng/mL DDU correlates with the absence of thrombosis in a patient with low and moderate pre-testprobability of thrombosis.    PHYSICAL EXAM:  Vital Signs Last 24 Hrs  T(C): 36.8 (28 Feb 2018 11:06), Max: 36.9 (28 Feb 2018 06:28)  T(F): 98.2 (28 Feb 2018 11:06), Max: 98.4 (28 Feb 2018 06:28)  HR: 82 (28 Feb 2018 11:06) (72 - 84)  BP: 110/62 (28 Feb 2018 11:06) (100/60 - 117/71)  RR: 17 (28 Feb 2018 11:06) (16 - 18)  SpO2: 98% (28 Feb 2018 11:06) (98% - 100%)    S1S2 RRR  CTA BL  SOFT ND NT +BS  NO C/C/E B/L LE    DIAGNOSTIC DATA  < from: Nuclear Stress Test-Exercise (02.28.18 @ 10:37) >  IMPRESSIONS:Normal Study  * The left ventricle was normal in size.  * Tracer uptake was homogeneous throughout the left  ventricle.  * Normal study; no evidence for myocardial infarction or  ischemia.  * Gated wallmotion analysis was performed, and shows  normal wall motion.  ------------------------------------------------------------------------  Confirmed on  2/28/2018 - 12:24:54 by Elvira Ott MD    < end of copied text >        ASSESSMENT AND PLAN:  46 y/o F with hx of uterine fibroids presents with chest pain x 1 week.   --ACS ruled out with serial CE  --D Dimer negative  --NST noted, no further invasive cardiac work up needed at this time    Lucy Silva PA-C  [facility]  [address], [address]   [place] [address]  office [phone]  pager [phone]

## 2018-03-05 VITALS
TEMPERATURE: 97 F | RESPIRATION RATE: 16 BRPM | WEIGHT: 164.91 LBS | HEART RATE: 63 BPM | DIASTOLIC BLOOD PRESSURE: 68 MMHG | OXYGEN SATURATION: 99 % | HEIGHT: 58 IN | SYSTOLIC BLOOD PRESSURE: 101 MMHG

## 2018-03-05 NOTE — PRE-OP CHECKLIST - SELECT TESTS ORDERED
PT/PTT/Urinalysis/CMP/EKG/CBC/INR CMP/Urinalysis/PT/PTT/INR/LMP/CBC/EKG CBC/PT/PTT/INR/Urinalysis/LMP FEB 6,2018 ICON - NEGATIVE/EKG/CMP

## 2018-03-05 NOTE — PRE-OP CHECKLIST - BP NONINVASIVE SYSTOLIC (MM HG)
Verbal/written post procedure instructions were given to patient/caregiver./Instructed patient/caregiver to follow-up with primary care physician./Instructed patient/caregiver regarding signs and symptoms of infection./Elevate the injured extremity as instructed./Keep the cast/splint/dressing clean and dry.
101

## 2018-03-05 NOTE — PATIENT PROFILE ADULT. - TEACHING/LEARNING LEARNING PREFERENCES
individual instruction written material/verbal instruction/skill demonstration/individual instruction

## 2018-03-06 ENCOUNTER — INPATIENT (INPATIENT)
Facility: HOSPITAL | Age: 48
LOS: 2 days | Discharge: HOME | DRG: 743 | End: 2018-03-09
Attending: OBSTETRICS & GYNECOLOGY | Admitting: OBSTETRICS & GYNECOLOGY
Payer: COMMERCIAL

## 2018-03-06 ENCOUNTER — RESULT REVIEW (OUTPATIENT)
Age: 48
End: 2018-03-06

## 2018-03-06 DIAGNOSIS — D36.9 BENIGN NEOPLASM, UNSPECIFIED SITE: Chronic | ICD-10-CM

## 2018-03-06 DIAGNOSIS — Z98.890 OTHER SPECIFIED POSTPROCEDURAL STATES: Chronic | ICD-10-CM

## 2018-03-06 LAB
HCT VFR BLD CALC: 32.2 % — LOW (ref 34.5–45)
HGB BLD-MCNC: 10.6 G/DL — LOW (ref 11.5–15.5)
MCHC RBC-ENTMCNC: 28.9 PG — SIGNIFICANT CHANGE UP (ref 27–34)
MCHC RBC-ENTMCNC: 32.9 G/DL — SIGNIFICANT CHANGE UP (ref 32–36)
MCV RBC AUTO: 87.7 FL — SIGNIFICANT CHANGE UP (ref 80–100)
PLATELET # BLD AUTO: 249 K/UL — SIGNIFICANT CHANGE UP (ref 150–400)
RBC # BLD: 3.67 M/UL — LOW (ref 3.8–5.2)
RBC # FLD: 12.1 % — SIGNIFICANT CHANGE UP (ref 10.3–16.9)
WBC # BLD: 10.3 K/UL — SIGNIFICANT CHANGE UP (ref 3.8–10.5)
WBC # FLD AUTO: 10.3 K/UL — SIGNIFICANT CHANGE UP (ref 3.8–10.5)

## 2018-03-06 RX ORDER — NALOXONE HYDROCHLORIDE 4 MG/.1ML
0.1 SPRAY NASAL
Qty: 0 | Refills: 0 | Status: DISCONTINUED | OUTPATIENT
Start: 2018-03-06 | End: 2018-03-09

## 2018-03-06 RX ORDER — SIMETHICONE 80 MG/1
80 TABLET, CHEWABLE ORAL EVERY 8 HOURS
Qty: 0 | Refills: 0 | Status: DISCONTINUED | OUTPATIENT
Start: 2018-03-06 | End: 2018-03-09

## 2018-03-06 RX ORDER — SODIUM CHLORIDE 9 MG/ML
1000 INJECTION, SOLUTION INTRAVENOUS
Qty: 0 | Refills: 0 | Status: DISCONTINUED | OUTPATIENT
Start: 2018-03-06 | End: 2018-03-08

## 2018-03-06 RX ORDER — ACETAMINOPHEN 500 MG
975 TABLET ORAL EVERY 6 HOURS
Qty: 0 | Refills: 0 | Status: DISCONTINUED | OUTPATIENT
Start: 2018-03-06 | End: 2018-03-07

## 2018-03-06 RX ORDER — MORPHINE SULFATE 50 MG/1
30 CAPSULE, EXTENDED RELEASE ORAL
Qty: 0 | Refills: 0 | Status: DISCONTINUED | OUTPATIENT
Start: 2018-03-06 | End: 2018-03-07

## 2018-03-06 RX ORDER — ONDANSETRON 8 MG/1
4 TABLET, FILM COATED ORAL EVERY 6 HOURS
Qty: 0 | Refills: 0 | Status: DISCONTINUED | OUTPATIENT
Start: 2018-03-06 | End: 2018-03-09

## 2018-03-06 RX ADMIN — Medication 975 MILLIGRAM(S): at 16:10

## 2018-03-06 RX ADMIN — Medication 975 MILLIGRAM(S): at 16:40

## 2018-03-06 RX ADMIN — MORPHINE SULFATE 30 MILLILITER(S): 50 CAPSULE, EXTENDED RELEASE ORAL at 11:27

## 2018-03-06 RX ADMIN — SIMETHICONE 80 MILLIGRAM(S): 80 TABLET, CHEWABLE ORAL at 19:16

## 2018-03-06 NOTE — H&P ADULT - ASSESSMENT
46 yo presenting for abdominal myomectomy, to be admitted to regional floor for routine postoperative care.  - ADAT  - IV hydration  - AM labs  - early ambulation, SCDs  - analgesia with Tylenol, morphine/dilaudid as needed for breakthrough (ALL to NSAIDS)

## 2018-03-06 NOTE — H&P ADULT - HISTORY OF PRESENT ILLNESS
48 yo nulliparous female presents for scheduled abdominal myomectomy.  She complains of bulk symptoms from growing fibroids; denies any pain or menorrhagia.  Had preoperative ultrasound and MRI which confirmed presence of large fibroids.  She has a history of prior abdominal myomectomy 5 years ago with initial improvement of symptoms which subsequently returned.

## 2018-03-07 LAB
ANION GAP SERPL CALC-SCNC: 11 MMOL/L — SIGNIFICANT CHANGE UP (ref 5–17)
BUN SERPL-MCNC: 11 MG/DL — SIGNIFICANT CHANGE UP (ref 7–23)
CALCIUM SERPL-MCNC: 8.3 MG/DL — LOW (ref 8.4–10.5)
CHLORIDE SERPL-SCNC: 100 MMOL/L — SIGNIFICANT CHANGE UP (ref 96–108)
CO2 SERPL-SCNC: 25 MMOL/L — SIGNIFICANT CHANGE UP (ref 22–31)
CREAT SERPL-MCNC: 0.72 MG/DL — SIGNIFICANT CHANGE UP (ref 0.5–1.3)
GLUCOSE SERPL-MCNC: 126 MG/DL — HIGH (ref 70–99)
HCT VFR BLD CALC: 28.6 % — LOW (ref 34.5–45)
HGB BLD-MCNC: 9.4 G/DL — LOW (ref 11.5–15.5)
MCHC RBC-ENTMCNC: 28.7 PG — SIGNIFICANT CHANGE UP (ref 27–34)
MCHC RBC-ENTMCNC: 32.9 G/DL — SIGNIFICANT CHANGE UP (ref 32–36)
MCV RBC AUTO: 87.2 FL — SIGNIFICANT CHANGE UP (ref 80–100)
PLATELET # BLD AUTO: 228 K/UL — SIGNIFICANT CHANGE UP (ref 150–400)
POTASSIUM SERPL-MCNC: 3.4 MMOL/L — LOW (ref 3.5–5.3)
POTASSIUM SERPL-SCNC: 3.4 MMOL/L — LOW (ref 3.5–5.3)
RBC # BLD: 3.28 M/UL — LOW (ref 3.8–5.2)
RBC # FLD: 12.3 % — SIGNIFICANT CHANGE UP (ref 10.3–16.9)
SODIUM SERPL-SCNC: 136 MMOL/L — SIGNIFICANT CHANGE UP (ref 135–145)
WBC # BLD: 9 K/UL — SIGNIFICANT CHANGE UP (ref 3.8–10.5)
WBC # FLD AUTO: 9 K/UL — SIGNIFICANT CHANGE UP (ref 3.8–10.5)

## 2018-03-07 RX ORDER — ACETAMINOPHEN 500 MG
975 TABLET ORAL EVERY 6 HOURS
Qty: 0 | Refills: 0 | Status: DISCONTINUED | OUTPATIENT
Start: 2018-03-07 | End: 2018-03-09

## 2018-03-07 RX ORDER — OXYCODONE AND ACETAMINOPHEN 5; 325 MG/1; MG/1
1 TABLET ORAL EVERY 4 HOURS
Qty: 0 | Refills: 0 | Status: DISCONTINUED | OUTPATIENT
Start: 2018-03-07 | End: 2018-03-09

## 2018-03-07 RX ORDER — DOCUSATE SODIUM 100 MG
100 CAPSULE ORAL
Qty: 0 | Refills: 0 | Status: DISCONTINUED | OUTPATIENT
Start: 2018-03-07 | End: 2018-03-09

## 2018-03-07 RX ORDER — HYDROCORTISONE 1 %
1 OINTMENT (GRAM) TOPICAL
Qty: 0 | Refills: 0 | Status: DISCONTINUED | OUTPATIENT
Start: 2018-03-07 | End: 2018-03-09

## 2018-03-07 RX ORDER — ZINC OXIDE 200 MG/G
1 OINTMENT TOPICAL
Qty: 0 | Refills: 0 | Status: DISCONTINUED | OUTPATIENT
Start: 2018-03-07 | End: 2018-03-09

## 2018-03-07 RX ADMIN — SIMETHICONE 80 MILLIGRAM(S): 80 TABLET, CHEWABLE ORAL at 13:32

## 2018-03-07 RX ADMIN — OXYCODONE AND ACETAMINOPHEN 1 TABLET(S): 5; 325 TABLET ORAL at 17:05

## 2018-03-07 RX ADMIN — Medication 975 MILLIGRAM(S): at 21:45

## 2018-03-07 RX ADMIN — ZINC OXIDE 1 APPLICATION(S): 200 OINTMENT TOPICAL at 18:24

## 2018-03-07 RX ADMIN — Medication 975 MILLIGRAM(S): at 21:11

## 2018-03-07 RX ADMIN — Medication 975 MILLIGRAM(S): at 06:02

## 2018-03-07 RX ADMIN — SIMETHICONE 80 MILLIGRAM(S): 80 TABLET, CHEWABLE ORAL at 21:12

## 2018-03-07 RX ADMIN — SIMETHICONE 80 MILLIGRAM(S): 80 TABLET, CHEWABLE ORAL at 05:16

## 2018-03-07 RX ADMIN — OXYCODONE AND ACETAMINOPHEN 1 TABLET(S): 5; 325 TABLET ORAL at 16:21

## 2018-03-07 RX ADMIN — ZINC OXIDE 1 APPLICATION(S): 200 OINTMENT TOPICAL at 11:51

## 2018-03-07 RX ADMIN — Medication 100 MILLIGRAM(S): at 21:09

## 2018-03-07 RX ADMIN — Medication 975 MILLIGRAM(S): at 07:02

## 2018-03-07 NOTE — PROGRESS NOTE ADULT - ASSESSMENT
A: 48yo POD1 s/p abdominal myomectomy for fibroid uterus. Pt is hemodynamically stable.     Plan:  1. Vital signs stable, continue to monitor per protocol  2. Pain control with PCA. Transition to OPM today.  Topical treatment for skin irritation from tape  3. DVT prophylaxis: SCDs  4. CV: IVH   5. Pulm: Incentive spirometer (at least 10 times per hour while awake)   6. GI: Diet Clears, regular with flatus  7. : Smiley   8. Heme: Post-procedure CBC 10.6 stable, AM labs penidng  9. Follow up labs: AM labs

## 2018-03-07 NOTE — PROGRESS NOTE ADULT - ASSESSMENT
A: 48yo POD1 s/p abdominal myomectomy for fibroid uterus. Pt is hemodynamically stable.     Plan:  1. Vital signs stable, continue to monitor per protocol  2. Pain control with PCA. Transition to OPM today.  Topical treatment for skin irritation from tape  3. DVT prophylaxis: SCDs  4. CV: IVH   5. Pulm: Incentive spirometer (at least 10 times per hour while awake)   6. GI: tolerating clear, advance to regular  7. : Smiley   8. Heme: Post-procedure CBC 10.6>9.4

## 2018-03-08 ENCOUNTER — TRANSCRIPTION ENCOUNTER (OUTPATIENT)
Age: 48
End: 2018-03-08

## 2018-03-08 LAB
HCT VFR BLD CALC: 30.1 % — LOW (ref 34.5–45)
HGB BLD-MCNC: 9.6 G/DL — LOW (ref 11.5–15.5)
MCHC RBC-ENTMCNC: 28.4 PG — SIGNIFICANT CHANGE UP (ref 27–34)
MCHC RBC-ENTMCNC: 31.9 G/DL — LOW (ref 32–36)
MCV RBC AUTO: 89.1 FL — SIGNIFICANT CHANGE UP (ref 80–100)
PLATELET # BLD AUTO: 259 K/UL — SIGNIFICANT CHANGE UP (ref 150–400)
RBC # BLD: 3.38 M/UL — LOW (ref 3.8–5.2)
RBC # FLD: 12.3 % — SIGNIFICANT CHANGE UP (ref 10.3–16.9)
WBC # BLD: 9.5 K/UL — SIGNIFICANT CHANGE UP (ref 3.8–10.5)
WBC # FLD AUTO: 9.5 K/UL — SIGNIFICANT CHANGE UP (ref 3.8–10.5)

## 2018-03-08 RX ORDER — FAMOTIDINE 10 MG/ML
20 INJECTION INTRAVENOUS DAILY
Qty: 0 | Refills: 0 | Status: DISCONTINUED | OUTPATIENT
Start: 2018-03-08 | End: 2018-03-09

## 2018-03-08 RX ADMIN — ZINC OXIDE 1 APPLICATION(S): 200 OINTMENT TOPICAL at 05:20

## 2018-03-08 RX ADMIN — Medication 100 MILLIGRAM(S): at 05:19

## 2018-03-08 RX ADMIN — FAMOTIDINE 20 MILLIGRAM(S): 10 INJECTION INTRAVENOUS at 18:42

## 2018-03-08 RX ADMIN — SIMETHICONE 80 MILLIGRAM(S): 80 TABLET, CHEWABLE ORAL at 05:19

## 2018-03-08 RX ADMIN — Medication 100 MILLIGRAM(S): at 18:42

## 2018-03-08 RX ADMIN — SIMETHICONE 80 MILLIGRAM(S): 80 TABLET, CHEWABLE ORAL at 23:26

## 2018-03-08 RX ADMIN — Medication 975 MILLIGRAM(S): at 18:00

## 2018-03-08 RX ADMIN — Medication 975 MILLIGRAM(S): at 05:19

## 2018-03-08 RX ADMIN — SIMETHICONE 80 MILLIGRAM(S): 80 TABLET, CHEWABLE ORAL at 13:13

## 2018-03-08 RX ADMIN — ZINC OXIDE 1 APPLICATION(S): 200 OINTMENT TOPICAL at 12:18

## 2018-03-08 RX ADMIN — Medication 975 MILLIGRAM(S): at 05:55

## 2018-03-08 RX ADMIN — Medication 975 MILLIGRAM(S): at 23:26

## 2018-03-08 RX ADMIN — Medication 975 MILLIGRAM(S): at 17:22

## 2018-03-08 RX ADMIN — ZINC OXIDE 1 APPLICATION(S): 200 OINTMENT TOPICAL at 18:43

## 2018-03-08 NOTE — DISCHARGE NOTE ADULT - CARE PROVIDER_API CALL
Nara Childers (MD), Obstetrics and Gynecology  [address] [place] [address] [address]  [place] [address]  Phone: [phone]  Fax: [phone]

## 2018-03-08 NOTE — PROGRESS NOTE ADULT - ASSESSMENT
48yo POD2 s/p abdominal myomectomy and lysis of adhesions for fibroid uterus.     Neuro: Tylenol or Percocet as need   Cardio: Tachycardic 110s- monitor VS  Pulm: Pt is satting at 97%   GI: Reg diet. Protonix   : Voiding   Heme: f/u CBC   DVT ppx: SCDs  Activity- ambulate as tolerated 48yo POD2 s/p abdominal myomectomy and lysis of adhesions for fibroid uterus.     Neuro: Tylenol or Percocet as need   Cardio: Tachycardic 110s- monitor VS  Pulm: Pt is satting at 97%   GI: Reg diet. Pepcid   : Voiding   Heme: f/u CBC   DVT ppx: SCDs  Activity- ambulate as tolerated

## 2018-03-08 NOTE — DISCHARGE NOTE ADULT - PLAN OF CARE
n/a Continue oral pain medications as needed.  Nothing in vagina - no sex, tampons, or douching.  Follow up in office with Dr. Childers.  Call the office to make an appointment.

## 2018-03-08 NOTE — DISCHARGE NOTE ADULT - CARE PLAN
Principal Discharge DX:	History of myomectomy  Goal:	n/a  Assessment and plan of treatment:	Continue oral pain medications as needed.  Nothing in vagina - no sex, tampons, or douching.  Follow up in office with Dr. Childers.  Call the office to make an appointment.

## 2018-03-08 NOTE — DISCHARGE NOTE ADULT - PATIENT PORTAL LINK FT
You can access the [facility], offered by [facility], by registering with the following website: [email or web address]

## 2018-03-08 NOTE — PROGRESS NOTE ADULT - ASSESSMENT
A: 46yo POD2 s/p abdominal myomectomy for fibroid uterus. Pt is hemodynamically stable.     Plan:  1. Vital signs stable, continue to monitor per protocol  2. Pain control with PCA. Transition to OPM today.  Topical treatment for skin irritation from tape  3. DVT prophylaxis: SCDs  4. CV: no issues  5. Pulm: Incentive spirometer (at least 10 times per hour while awake)   6. GI: regular diet  7. : Smiley   8. Heme: Post-procedure CBC 10.6>9.4

## 2018-03-09 VITALS
RESPIRATION RATE: 16 BRPM | HEART RATE: 91 BPM | DIASTOLIC BLOOD PRESSURE: 76 MMHG | SYSTOLIC BLOOD PRESSURE: 111 MMHG | TEMPERATURE: 98 F | OXYGEN SATURATION: 91 %

## 2018-03-09 LAB
HCT VFR BLD CALC: 28.1 % — LOW (ref 34.5–45)
HGB BLD-MCNC: 8.9 G/DL — LOW (ref 11.5–15.5)
MCHC RBC-ENTMCNC: 27.9 PG — SIGNIFICANT CHANGE UP (ref 27–34)
MCHC RBC-ENTMCNC: 31.7 G/DL — LOW (ref 32–36)
MCV RBC AUTO: 88.1 FL — SIGNIFICANT CHANGE UP (ref 80–100)
PLATELET # BLD AUTO: 265 K/UL — SIGNIFICANT CHANGE UP (ref 150–400)
RBC # BLD: 3.19 M/UL — LOW (ref 3.8–5.2)
RBC # FLD: 11.7 % — SIGNIFICANT CHANGE UP (ref 10.3–16.9)
SURGICAL PATHOLOGY STUDY: SIGNIFICANT CHANGE UP
WBC # BLD: 9 K/UL — SIGNIFICANT CHANGE UP (ref 3.8–10.5)
WBC # FLD AUTO: 9 K/UL — SIGNIFICANT CHANGE UP (ref 3.8–10.5)

## 2018-03-09 PROCEDURE — 36415 COLL VENOUS BLD VENIPUNCTURE: CPT

## 2018-03-09 PROCEDURE — 88305 TISSUE EXAM BY PATHOLOGIST: CPT

## 2018-03-09 PROCEDURE — C1889: CPT

## 2018-03-09 PROCEDURE — 86850 RBC ANTIBODY SCREEN: CPT

## 2018-03-09 PROCEDURE — 86901 BLOOD TYPING SEROLOGIC RH(D): CPT

## 2018-03-09 PROCEDURE — 88304 TISSUE EXAM BY PATHOLOGIST: CPT

## 2018-03-09 PROCEDURE — 86900 BLOOD TYPING SEROLOGIC ABO: CPT

## 2018-03-09 PROCEDURE — 85027 COMPLETE CBC AUTOMATED: CPT

## 2018-03-09 PROCEDURE — 80048 BASIC METABOLIC PNL TOTAL CA: CPT

## 2018-03-09 RX ADMIN — Medication 975 MILLIGRAM(S): at 13:24

## 2018-03-09 RX ADMIN — Medication 975 MILLIGRAM(S): at 00:26

## 2018-03-09 RX ADMIN — Medication 100 MILLIGRAM(S): at 05:35

## 2018-03-09 RX ADMIN — SIMETHICONE 80 MILLIGRAM(S): 80 TABLET, CHEWABLE ORAL at 05:35

## 2018-03-09 RX ADMIN — ZINC OXIDE 1 APPLICATION(S): 200 OINTMENT TOPICAL at 05:35

## 2018-03-09 NOTE — PROGRESS NOTE ADULT - ASSESSMENT
48yo POD3 s/p abdominal myomectomy and lysis of adhesions for fibroid uterus.     Neuro: Tylenol or Percocet as need   Cardio: Tachycardia improving, f/u EKG  Pulm: Pt is satting at 97%   GI: Reg diet. Pepcid   : Voiding   Heme: f/u CBC   DVT ppx: SCDs  Activity- ambulate as tolerated

## 2018-03-09 NOTE — PROGRESS NOTE ADULT - SUBJECTIVE AND OBJECTIVE BOX
GYN POC    Pt seen and examined at bedside. Pt complains of mild abdominal pain controlled with PCA.  Pt has had jello and water so far and has tolerated.   Pt denies any fever, chills, chest pain, SOB, nausea or vomiting     T(F): 98 (03-06-18 @ 13:40), Max: 98 (03-06-18 @ 13:40)  HR: 76 (03-06-18 @ 13:40) (76 - 98)  BP: 94/54 (03-06-18 @ 13:40) (87/51 - 107/59)  RR: 17 (03-06-18 @ 13:40) (17 - 20)  SpO2: 100% (03-06-18 @ 13:40) (99% - 100%)  Wt(kg): --    03-06 @ 07:01  -  03-06 @ 16:23  --------------------------------------------------------  IN: 375 mL / OUT: 0 mL / NET: 375 mL    acetaminophen   Tablet. 975 milliGRAM(s) Oral every 6 hours PRN Moderate Pain (4 - 6)  lactated ringers. 1000 milliLiter(s) IV Continuous <Continuous>  morphine PCA (5 mG/mL) 30 milliLiter(s) PCA Continuous PCA Continuous  naloxone Injectable 0.1 milliGRAM(s) IV Push every 3 minutes PRN For ANY of the following changes in patient status:  A. RR LESS THAN 10 breaths per minute, B. Oxygen saturation LESS THAN 90%, C. Sedation score of 6  ondansetron Injectable 4 milliGRAM(s) IV Push every 6 hours PRN Nausea    Physical exam:  Constitutional: NAD  Abdomen: incision site clean, dry and intact. Soft, mildly tender, nondistended  Extremities: no lower extremity edema, or calve tenderness. SCDs in place    A: 48yo POD0 s/p abdominal myomectomy for fibroid uterus. Pt is hemodynamically stable.     Plan:  1. Vital signs stable, continue to monitor per protocol  2. Pain control with PCA. Tylenol as needed   3. DVT prophylaxis: SCDs  4. CV: IVH   5. Pulm: Incentive spirometer (at least 10 times per hour while awake)   6. GI: Diet Clears   7. : Baljit   8. Heme: Post-procedure CBC 10.6 stable   9. Follow up labs: AM labs   10. Activity: bedrest tonight
GYN Progress Note    Patient seen at bedside.  MARCELA overnight.  Reports that she is feeling a bit better this AM.  Pain controlled.  Occasional shortness of breath possibly secondary to abdominal pain but has been able to ambulate without difficulty.  Tolerating regular diet, had bowel movement this AM.        Vital Signs Last 24 Hrs  T(C): 36.7 (09 Mar 2018 05:39), Max: 37.1 (08 Mar 2018 17:42)  T(F): 98 (09 Mar 2018 05:39), Max: 98.7 (08 Mar 2018 17:42)  HR: 91 (09 Mar 2018 05:39) (91 - 113)  BP: 111/76 (09 Mar 2018 05:39) (106/73 - 124/85)  BP(mean): --  RR: 16 (09 Mar 2018 05:39) (16 - 17)  SpO2: 91% (09 Mar 2018 05:39) (91% - 97%)    Physical Exam:  Gen: No Acute Distress  Pulm: Clear to auscultation bilaterally  GI: soft, mildly tender over fundus.  Nondistended, +BS, no rebound, no guarding.  Incision C/D/I with steristrips  Ext: SCDs in place, wnl    I&O's Summary    08 Mar 2018 07:01  -  09 Mar 2018 07:00  --------------------------------------------------------  IN: 300 mL / OUT: 300 mL / NET: 0 mL      MEDICATIONS  (STANDING):  docusate sodium 100 milliGRAM(s) Oral two times a day  famotidine    Tablet 20 milliGRAM(s) Oral daily  hydrocortisone 0.5% Cream 1 Application(s) Topical two times a day  simethicone 80 milliGRAM(s) Chew every 8 hours  zinc oxide 40% Ointment 1 Application(s) Topical four times a day    MEDICATIONS  (PRN):  acetaminophen   Tablet. 975 milliGRAM(s) Oral every 6 hours PRN Moderate Pain (4 - 6)  naloxone Injectable 0.1 milliGRAM(s) IV Push every 3 minutes PRN For ANY of the following changes in patient status:  A. RR LESS THAN 10 breaths per minute, B. Oxygen saturation LESS THAN 90%, C. Sedation score of 6  ondansetron Injectable 4 milliGRAM(s) IV Push every 6 hours PRN Nausea  oxyCODONE    5 mG/acetaminophen 325 mG 1 Tablet(s) Oral every 4 hours PRN Severe Pain (7 - 10)      LABS:                        8.9    9.0   )-----------( 265      ( 09 Mar 2018 06:45 )             28.1
GYN Progress Note    Patient seen at bedside.  MARCELA overnight.  Reports that she is feeling well and that pain has been controlled.  She has felt some skin irritation from silk tape on pressure dressing.  She has been out of bed once.  No flatus yet.  Tolerating clears.  Smiley was just removed, due to void.    Denies CP, palpitations, SOB, fever, chills, nausea, vomiting.    Vital Signs Last 24 Hrs  T(C): 36.9 (07 Mar 2018 05:31), Max: 37.1 (07 Mar 2018 00:42)  T(F): 98.5 (07 Mar 2018 05:31), Max: 98.8 (07 Mar 2018 00:42)  HR: 96 (07 Mar 2018 05:31) (74 - 98)  BP: 106/67 (07 Mar 2018 05:31) (87/51 - 107/59)  BP(mean): 70 (06 Mar 2018 12:57) (63 - 76)  RR: 16 (07 Mar 2018 05:31) (16 - 20)  SpO2: 97% (07 Mar 2018 05:31) (96% - 100%)    Physical Exam:  Gen: No Acute Distress  Pulm: Clear to auscultation bilaterally  GI: soft, nontender, mildly distended, +BS, no rebound, no guarding.  Incision C/D/I with steristrips in place, no induration or drainage.    Ext: SCDs in place, wnl    I&O's Summary    06 Mar 2018 07:01  -  07 Mar 2018 07:00  --------------------------------------------------------  IN: 2750 mL / OUT: 1400 mL / NET: 1350 mL      MEDICATIONS  (STANDING):  lactated ringers. 1000 milliLiter(s) (125 mL/Hr) IV Continuous <Continuous>  morphine PCA (5 mG/mL) 30 milliLiter(s) PCA Continuous PCA Continuous  simethicone 80 milliGRAM(s) Chew every 8 hours    MEDICATIONS  (PRN):  acetaminophen   Tablet. 975 milliGRAM(s) Oral every 6 hours PRN Moderate Pain (4 - 6)  naloxone Injectable 0.1 milliGRAM(s) IV Push every 3 minutes PRN For ANY of the following changes in patient status:  A. RR LESS THAN 10 breaths per minute, B. Oxygen saturation LESS THAN 90%, C. Sedation score of 6  ondansetron Injectable 4 milliGRAM(s) IV Push every 6 hours PRN Nausea      LABS:                        10.6   10.3  )-----------( 249      ( 06 Mar 2018 15:59 )             32.2
GYN Progress Note    Patient seen at bedside.  She reports feeling well and is tolerating clear tray.  Passed flatus.  Voiding spontaneously and ambulating without issue.  Using incentive spirometer.  Pain controlled.  Denies CP, palpitations, SOB, fever, chills, nausea, vomiting.    Vital Signs Last 24 Hrs  T(C): 36.6 (07 Mar 2018 08:05), Max: 37.1 (07 Mar 2018 00:42)  T(F): 97.8 (07 Mar 2018 08:05), Max: 98.8 (07 Mar 2018 00:42)  HR: 84 (07 Mar 2018 08:05) (74 - 96)  BP: 92/59 (07 Mar 2018 08:05) (92/59 - 106/67)  BP(mean): --  RR: 16 (07 Mar 2018 08:05) (16 - 17)  SpO2: 95% (07 Mar 2018 08:05) (95% - 100%)    Physical Exam:  Gen: No Acute Distress  Pulm: Clear to auscultation bilaterally  GI: soft, nontender, mildly distended, +BS, no rebound, no guarding.  Incision C/D/I with steristrips  Ext: SCDs in place, wnl    I&O's Summary    06 Mar 2018 07:01  -  07 Mar 2018 07:00  --------------------------------------------------------  IN: 2750 mL / OUT: 1400 mL / NET: 1350 mL    07 Mar 2018 07:01  -  07 Mar 2018 13:22  --------------------------------------------------------  IN: 500 mL / OUT: 1000 mL / NET: -500 mL      MEDICATIONS  (STANDING):  hydrocortisone 0.5% Cream 1 Application(s) Topical two times a day  lactated ringers. 1000 milliLiter(s) (125 mL/Hr) IV Continuous <Continuous>  simethicone 80 milliGRAM(s) Chew every 8 hours  zinc oxide 40% Ointment 1 Application(s) Topical four times a day    MEDICATIONS  (PRN):  acetaminophen   Tablet. 975 milliGRAM(s) Oral every 6 hours PRN Moderate Pain (4 - 6)  naloxone Injectable 0.1 milliGRAM(s) IV Push every 3 minutes PRN For ANY of the following changes in patient status:  A. RR LESS THAN 10 breaths per minute, B. Oxygen saturation LESS THAN 90%, C. Sedation score of 6  ondansetron Injectable 4 milliGRAM(s) IV Push every 6 hours PRN Nausea  oxyCODONE    5 mG/acetaminophen 325 mG 1 Tablet(s) Oral every 4 hours PRN Severe Pain (7 - 10)      LABS:                        9.4    9.0   )-----------( 228      ( 07 Mar 2018 07:16 )             28.6     03-07    136  |  100  |  11  ----------------------------<  126<H>  3.4<L>   |  25  |  0.72    Ca    8.3<L>      07 Mar 2018 07:18
Pt seen and examined at bedside. Pt states mild abdominal pain. She states she did not ask for pain medication since 5AM because she did not know she had to ask for it. Pt is ambulating but expresses feeling SOB and feels her "food getting stuck" when she eats. Pt denies hx of GERD. She is passing flatus, and urinating adequately.   Pt denies fever, chills, chest pain, nausea, vomiting, lightheadedness, or dizziness.      T(F): 97.6 (03-08-18 @ 14:18), Max: 98.9 (03-08-18 @ 05:50)  HR: 110 (03-08-18 @ 14:18) (89 - 110)  BP: 124/79 (03-08-18 @ 14:18) (109/76 - 124/79)  RR: 17 (03-08-18 @ 14:18) (16 - 17)  SpO2: 97% (03-08-18 @ 14:18) (96% - 97%)  Wt(kg): --  I&O's Summary    07 Mar 2018 07:01  -  08 Mar 2018 07:00  --------------------------------------------------------  IN: 2750 mL / OUT: 1800 mL / NET: 950 mL    MEDICATIONS  (STANDING):  docusate sodium 100 milliGRAM(s) Oral two times a day  hydrocortisone 0.5% Cream 1 Application(s) Topical two times a day  simethicone 80 milliGRAM(s) Chew every 8 hours  zinc oxide 40% Ointment 1 Application(s) Topical four times a day    MEDICATIONS  (PRN):  acetaminophen   Tablet. 975 milliGRAM(s) Oral every 6 hours PRN Moderate Pain (4 - 6)  naloxone Injectable 0.1 milliGRAM(s) IV Push every 3 minutes PRN For ANY of the following changes in patient status:  A. RR LESS THAN 10 breaths per minute, B. Oxygen saturation LESS THAN 90%, C. Sedation score of 6  ondansetron Injectable 4 milliGRAM(s) IV Push every 6 hours PRN Nausea  oxyCODONE    5 mG/acetaminophen 325 mG 1 Tablet(s) Oral every 4 hours PRN Severe Pain (7 - 10)    Physical Exam:  Constitutional: NAD  Pulmonary: clear to auscultation bilaterally   Cardiovascular: Regular rate and rhythm   Abdomen: incision site clean, dry, intact. Soft, mildly tender, nondistended, no guarding, no rebound, +bowel sounds  Extremities: no lower extremity edema or calve tenderness. SCDs in place     LABS:                        9.4    9.0   )-----------( 228      ( 07 Mar 2018 07:16 )             28.6     03-07    136  |  100  |  11  ----------------------------<  126<H>  3.4<L>   |  25  |  0.72    Ca    8.3<L>      07 Mar 2018 07:18
GYN Progress Note    Patient seen at bedside for AM rounds.  Feeling well this AM, was able to sleep overnight.  Pain controlled.  Tolerated regular dinner.  No nausea/vomiting.  Voiding spontaneously.  +flatus, no BM.  Had small amount of bleeding from incision, now dry.   Minimal vaginal bleeding due to start of period.      Denies CP, palpitations, SOB, fever, chills.    Vital Signs Last 24 Hrs  T(C): 37.2 (08 Mar 2018 05:50), Max: 37.2 (08 Mar 2018 05:50)  T(F): 98.9 (08 Mar 2018 05:50), Max: 98.9 (08 Mar 2018 05:50)  HR: 99 (08 Mar 2018 05:50) (84 - 99)  BP: 109/76 (08 Mar 2018 05:50) (92/59 - 112/77)  BP(mean): --  RR: 16 (08 Mar 2018 05:50) (16 - 16)  SpO2: 96% (08 Mar 2018 05:50) (95% - 97%)    Physical Exam:  Gen: No Acute Distress  Pulm: Clear to auscultation bilaterally  GI: soft, appropriately tender, mildly distended, +BS, no rebound, no guarding.  Incision well approximated with steristrips, minimal dried blood at right aspect, no drainage or active  bleeding  Ext: SCDs in place, wnl    I&O's Summary    06 Mar 2018 07:01  -  07 Mar 2018 07:00  --------------------------------------------------------  IN: 2750 mL / OUT: 1400 mL / NET: 1350 mL    07 Mar 2018 07:01  -  08 Mar 2018 06:34  --------------------------------------------------------  IN: 2750 mL / OUT: 1800 mL / NET: 950 mL      MEDICATIONS  (STANDING):  docusate sodium 100 milliGRAM(s) Oral two times a day  hydrocortisone 0.5% Cream 1 Application(s) Topical two times a day  simethicone 80 milliGRAM(s) Chew every 8 hours  zinc oxide 40% Ointment 1 Application(s) Topical four times a day    MEDICATIONS  (PRN):  acetaminophen   Tablet. 975 milliGRAM(s) Oral every 6 hours PRN Moderate Pain (4 - 6)  naloxone Injectable 0.1 milliGRAM(s) IV Push every 3 minutes PRN For ANY of the following changes in patient status:  A. RR LESS THAN 10 breaths per minute, B. Oxygen saturation LESS THAN 90%, C. Sedation score of 6  ondansetron Injectable 4 milliGRAM(s) IV Push every 6 hours PRN Nausea  oxyCODONE    5 mG/acetaminophen 325 mG 1 Tablet(s) Oral every 4 hours PRN Severe Pain (7 - 10)      LABS:                        9.4    9.0   )-----------( 228      ( 07 Mar 2018 07:16 )             28.6     03-07    136  |  100  |  11  ----------------------------<  126<H>  3.4<L>   |  25  |  0.72    Ca    8.3<L>      07 Mar 2018 07:18

## 2018-03-13 DIAGNOSIS — Z88.6 ALLERGY STATUS TO ANALGESIC AGENT: ICD-10-CM

## 2018-03-13 DIAGNOSIS — Z28.21 IMMUNIZATION NOT CARRIED OUT BECAUSE OF PATIENT REFUSAL: ICD-10-CM

## 2018-03-13 DIAGNOSIS — D25.0 SUBMUCOUS LEIOMYOMA OF UTERUS: ICD-10-CM

## 2018-05-21 ENCOUNTER — APPOINTMENT (OUTPATIENT)
Dept: HUMAN REPRODUCTION | Facility: CLINIC | Age: 48
End: 2018-05-21

## 2018-05-24 ENCOUNTER — APPOINTMENT (OUTPATIENT)
Dept: HUMAN REPRODUCTION | Facility: CLINIC | Age: 48
End: 2018-05-24

## 2018-07-02 ENCOUNTER — EMERGENCY (EMERGENCY)
Facility: HOSPITAL | Age: 48
LOS: 1 days | Discharge: HOME | End: 2018-07-02
Attending: EMERGENCY MEDICINE | Admitting: EMERGENCY MEDICINE
Payer: COMMERCIAL

## 2018-07-02 VITALS
RESPIRATION RATE: 18 BRPM | SYSTOLIC BLOOD PRESSURE: 100 MMHG | HEART RATE: 75 BPM | TEMPERATURE: 98 F | OXYGEN SATURATION: 98 % | DIASTOLIC BLOOD PRESSURE: 66 MMHG

## 2018-07-02 DIAGNOSIS — Z98.890 OTHER SPECIFIED POSTPROCEDURAL STATES: Chronic | ICD-10-CM

## 2018-07-02 DIAGNOSIS — D36.9 BENIGN NEOPLASM, UNSPECIFIED SITE: Chronic | ICD-10-CM

## 2018-07-02 LAB
ALBUMIN SERPL ELPH-MCNC: 3.8 G/DL — SIGNIFICANT CHANGE UP (ref 3.3–5)
ALP SERPL-CCNC: 59 U/L — SIGNIFICANT CHANGE UP (ref 40–120)
ALT FLD-CCNC: 10 U/L — SIGNIFICANT CHANGE UP (ref 4–33)
AST SERPL-CCNC: 14 U/L — SIGNIFICANT CHANGE UP (ref 4–32)
BILIRUB SERPL-MCNC: 0.4 MG/DL — SIGNIFICANT CHANGE UP (ref 0.2–1.2)
BUN SERPL-MCNC: 13 MG/DL — SIGNIFICANT CHANGE UP (ref 7–23)
CALCIUM SERPL-MCNC: 8.6 MG/DL — SIGNIFICANT CHANGE UP (ref 8.4–10.5)
CHLORIDE SERPL-SCNC: 103 MMOL/L — SIGNIFICANT CHANGE UP (ref 98–107)
CO2 SERPL-SCNC: 27 MMOL/L — SIGNIFICANT CHANGE UP (ref 22–31)
CREAT SERPL-MCNC: 0.93 MG/DL — SIGNIFICANT CHANGE UP (ref 0.5–1.3)
GLUCOSE SERPL-MCNC: 79 MG/DL — SIGNIFICANT CHANGE UP (ref 70–99)
HCT VFR BLD CALC: 36.3 % — SIGNIFICANT CHANGE UP (ref 34.5–45)
HGB BLD-MCNC: 11.9 G/DL — SIGNIFICANT CHANGE UP (ref 11.5–15.5)
MCHC RBC-ENTMCNC: 27.9 PG — SIGNIFICANT CHANGE UP (ref 27–34)
MCHC RBC-ENTMCNC: 32.8 % — SIGNIFICANT CHANGE UP (ref 32–36)
MCV RBC AUTO: 85.2 FL — SIGNIFICANT CHANGE UP (ref 80–100)
NRBC # FLD: 0 — SIGNIFICANT CHANGE UP
PLATELET # BLD AUTO: 302 K/UL — SIGNIFICANT CHANGE UP (ref 150–400)
PMV BLD: 9.7 FL — SIGNIFICANT CHANGE UP (ref 7–13)
POTASSIUM SERPL-MCNC: 4.4 MMOL/L — SIGNIFICANT CHANGE UP (ref 3.5–5.3)
POTASSIUM SERPL-SCNC: 4.4 MMOL/L — SIGNIFICANT CHANGE UP (ref 3.5–5.3)
PROT SERPL-MCNC: 6.7 G/DL — SIGNIFICANT CHANGE UP (ref 6–8.3)
RBC # BLD: 4.26 M/UL — SIGNIFICANT CHANGE UP (ref 3.8–5.2)
RBC # FLD: 12.1 % — SIGNIFICANT CHANGE UP (ref 10.3–14.5)
SODIUM SERPL-SCNC: 138 MMOL/L — SIGNIFICANT CHANGE UP (ref 135–145)
TROPONIN T, HIGH SENSITIVITY: < 6 NG/L — SIGNIFICANT CHANGE UP (ref ?–14)
WBC # BLD: 5.6 K/UL — SIGNIFICANT CHANGE UP (ref 3.8–10.5)
WBC # FLD AUTO: 5.6 K/UL — SIGNIFICANT CHANGE UP (ref 3.8–10.5)

## 2018-07-02 PROCEDURE — 99284 EMERGENCY DEPT VISIT MOD MDM: CPT | Mod: 25

## 2018-07-02 PROCEDURE — 93010 ELECTROCARDIOGRAM REPORT: CPT

## 2018-07-02 PROCEDURE — 71046 X-RAY EXAM CHEST 2 VIEWS: CPT | Mod: 26

## 2018-07-02 RX ORDER — ACETAMINOPHEN 500 MG
650 TABLET ORAL ONCE
Qty: 0 | Refills: 0 | Status: COMPLETED | OUTPATIENT
Start: 2018-07-02 | End: 2018-07-02

## 2018-07-02 RX ADMIN — Medication 650 MILLIGRAM(S): at 12:09

## 2018-07-02 NOTE — ED PROVIDER NOTE - ENMT, MLM
Airway patent, Nasal mucosa clear. Mouth with normal mucosa. Throat has no vesicles, no oropharyngeal exudates and uvula is midline.  mandible and dental exam normal

## 2018-07-02 NOTE — ED PROVIDER NOTE - MUSCULOSKELETAL, MLM
Spine appears normal, range of motion is not limited, no muscle or joint tenderness. No leg swelling/tenderness

## 2018-07-02 NOTE — ED ADULT NURSE REASSESSMENT NOTE - NS ED NURSE REASSESS COMMENT FT1
intake pt coming with chest area rad. to left arm x few days pt stated she's been carrying her hand bag that is too heavy now to neck area, denies dizziness/SOB at present time.    Rachel Diamond RN

## 2018-07-02 NOTE — ED ADULT TRIAGE NOTE - CHIEF COMPLAINT QUOTE
p/t c/o of chest discomfort for few days and tingling sensation to lt jaw and lt arm since this am p/t c/o of chest discomfort for few days and tingling sensation to lt jaw and lt arm since this am, p/t evaluated by dr Cordova no code stroke initiated

## 2018-07-02 NOTE — ED PROVIDER NOTE - PROGRESS NOTE DETAILS
Dr. Cordova: Was called for stroke eval. Pt states having intermittent left sided chest discomfort, described as a sticking sensation over the past day. Pt states today that sensation was associated with some tingling down her left arm and to her left jaw area. Pt denies any weakness. Denies any difficulty swallowing. CNII-XII grossly intact. No facial asymmetry. 5/5 b/l UE and LE. Labs Troponin, EKG and CXR normal. No indication for admission. Pain likely due to costochondritis. DC home on tylenol and follow up with own doctors

## 2018-07-02 NOTE — ED PROVIDER NOTE - OBJECTIVE STATEMENT
47F h.o myomectomy, on no meds, c/o chest pain worse for 4 days, L ant chest, some radiation to jaw and left arm with slight tingling. Pain worse with movement and touch to L ant chest. Not exertional. Also worsened by stress. No dyspnea, cough, fever, nausea, diaphoresis, neuro symptoms. She has had similar chest pains in the last 2 years and has had negative work up including Ct scan and stress test done 2/2018. Review of Symptoms in all systems otherwise negative, except as indicated

## 2018-07-02 NOTE — ED PROVIDER NOTE - MEDICAL DECISION MAKING DETAILS
atypical CP in healthy 47F with no risk factors for CAD/PE, tenderness L costochondral junction, normal EKG. Plan: Tylenol, CXR, troponin

## 2018-07-02 NOTE — ED ADULT NURSE REASSESSMENT NOTE - NS ED NURSE REASSESS COMMENT FT1
Pt feels  better.  Pt treated and released by MD Villavicencio with instruction, pt verbalized understanding.  IV removed.  Left stable.

## 2018-11-14 ENCOUNTER — EMERGENCY (EMERGENCY)
Facility: HOSPITAL | Age: 48
LOS: 1 days | Discharge: HOME | End: 2018-11-14
Attending: EMERGENCY MEDICINE
Payer: COMMERCIAL

## 2018-11-14 VITALS
HEIGHT: 59 IN | HEART RATE: 94 BPM | DIASTOLIC BLOOD PRESSURE: 70 MMHG | OXYGEN SATURATION: 97 % | WEIGHT: 158.95 LBS | RESPIRATION RATE: 16 BRPM | TEMPERATURE: 98 F | SYSTOLIC BLOOD PRESSURE: 111 MMHG

## 2018-11-14 DIAGNOSIS — Z98.890 OTHER SPECIFIED POSTPROCEDURAL STATES: Chronic | ICD-10-CM

## 2018-11-14 DIAGNOSIS — D36.9 BENIGN NEOPLASM, UNSPECIFIED SITE: Chronic | ICD-10-CM

## 2018-11-14 LAB
ALBUMIN SERPL ELPH-MCNC: 3.4 G/DL — LOW (ref 3.5–5)
ALP SERPL-CCNC: 64 U/L — SIGNIFICANT CHANGE UP (ref 40–120)
ALT FLD-CCNC: 26 U/L DA — SIGNIFICANT CHANGE UP (ref 10–60)
ANION GAP SERPL CALC-SCNC: 9 MMOL/L — SIGNIFICANT CHANGE UP (ref 5–17)
AST SERPL-CCNC: 28 U/L — SIGNIFICANT CHANGE UP (ref 10–40)
BASOPHILS # BLD AUTO: 0 K/UL — SIGNIFICANT CHANGE UP (ref 0–0.2)
BASOPHILS NFR BLD AUTO: 0.3 % — SIGNIFICANT CHANGE UP (ref 0–2)
BILIRUB SERPL-MCNC: 0.3 MG/DL — SIGNIFICANT CHANGE UP (ref 0.2–1.2)
BUN SERPL-MCNC: 14 MG/DL — SIGNIFICANT CHANGE UP (ref 7–18)
CALCIUM SERPL-MCNC: 8.2 MG/DL — LOW (ref 8.4–10.5)
CHLORIDE SERPL-SCNC: 105 MMOL/L — SIGNIFICANT CHANGE UP (ref 96–108)
CO2 SERPL-SCNC: 24 MMOL/L — SIGNIFICANT CHANGE UP (ref 22–31)
CREAT SERPL-MCNC: 0.78 MG/DL — SIGNIFICANT CHANGE UP (ref 0.5–1.3)
EOSINOPHIL # BLD AUTO: 0 K/UL — SIGNIFICANT CHANGE UP (ref 0–0.5)
EOSINOPHIL NFR BLD AUTO: 0.9 % — SIGNIFICANT CHANGE UP (ref 0–6)
GLUCOSE SERPL-MCNC: 119 MG/DL — HIGH (ref 70–99)
HCT VFR BLD CALC: 34.5 % — SIGNIFICANT CHANGE UP (ref 34.5–45)
HGB BLD-MCNC: 10.9 G/DL — LOW (ref 11.5–15.5)
LYMPHOCYTES # BLD AUTO: 0.8 K/UL — LOW (ref 1–3.3)
LYMPHOCYTES # BLD AUTO: 25.4 % — SIGNIFICANT CHANGE UP (ref 13–44)
MCHC RBC-ENTMCNC: 27.3 PG — SIGNIFICANT CHANGE UP (ref 27–34)
MCHC RBC-ENTMCNC: 31.7 GM/DL — LOW (ref 32–36)
MCV RBC AUTO: 86.3 FL — SIGNIFICANT CHANGE UP (ref 80–100)
MONOCYTES # BLD AUTO: 0.2 K/UL — SIGNIFICANT CHANGE UP (ref 0–0.9)
MONOCYTES NFR BLD AUTO: 5.2 % — SIGNIFICANT CHANGE UP (ref 2–14)
NEUTROPHILS # BLD AUTO: 2.2 K/UL — SIGNIFICANT CHANGE UP (ref 1.8–7.4)
NEUTROPHILS NFR BLD AUTO: 68.2 % — SIGNIFICANT CHANGE UP (ref 43–77)
PLATELET # BLD AUTO: 282 K/UL — SIGNIFICANT CHANGE UP (ref 150–400)
POTASSIUM SERPL-MCNC: 3.9 MMOL/L — SIGNIFICANT CHANGE UP (ref 3.5–5.3)
POTASSIUM SERPL-SCNC: 3.9 MMOL/L — SIGNIFICANT CHANGE UP (ref 3.5–5.3)
PROT SERPL-MCNC: 7.3 G/DL — SIGNIFICANT CHANGE UP (ref 6–8.3)
RBC # BLD: 4 M/UL — SIGNIFICANT CHANGE UP (ref 3.8–5.2)
RBC # FLD: 11 % — SIGNIFICANT CHANGE UP (ref 10.3–14.5)
SODIUM SERPL-SCNC: 138 MMOL/L — SIGNIFICANT CHANGE UP (ref 135–145)
WBC # BLD: 3.3 K/UL — LOW (ref 3.8–10.5)
WBC # FLD AUTO: 3.3 K/UL — LOW (ref 3.8–10.5)

## 2018-11-14 PROCEDURE — 99283 EMERGENCY DEPT VISIT LOW MDM: CPT | Mod: 25

## 2018-11-14 PROCEDURE — 96372 THER/PROPH/DIAG INJ SC/IM: CPT

## 2018-11-14 PROCEDURE — 86780 TREPONEMA PALLIDUM: CPT

## 2018-11-14 PROCEDURE — 99284 EMERGENCY DEPT VISIT MOD MDM: CPT

## 2018-11-14 PROCEDURE — 87591 N.GONORRHOEAE DNA AMP PROB: CPT

## 2018-11-14 PROCEDURE — 80053 COMPREHEN METABOLIC PANEL: CPT

## 2018-11-14 PROCEDURE — 87491 CHLMYD TRACH DNA AMP PROBE: CPT

## 2018-11-14 PROCEDURE — 85027 COMPLETE CBC AUTOMATED: CPT

## 2018-11-14 RX ORDER — FAMOTIDINE 10 MG/ML
20 INJECTION INTRAVENOUS ONCE
Qty: 0 | Refills: 0 | Status: COMPLETED | OUTPATIENT
Start: 2018-11-14 | End: 2018-11-14

## 2018-11-14 RX ORDER — DIPHENHYDRAMINE HCL 50 MG
50 CAPSULE ORAL ONCE
Qty: 0 | Refills: 0 | Status: COMPLETED | OUTPATIENT
Start: 2018-11-14 | End: 2018-11-14

## 2018-11-14 RX ADMIN — Medication 50 MILLIGRAM(S): at 19:25

## 2018-11-14 RX ADMIN — Medication 60 MILLIGRAM(S): at 19:25

## 2018-11-14 RX ADMIN — FAMOTIDINE 20 MILLIGRAM(S): 10 INJECTION INTRAVENOUS at 19:25

## 2018-11-14 NOTE — ED PROVIDER NOTE - OBJECTIVE STATEMENT
itching on palms and soles of feet for several days  took otc benadryl and zyrtec now with dry mouth  no allergies no new soaps/perfumes no new pets no travel no sick contacts  had liposuction three weeks ago with no complications   had similar itching three years ago  none since  has a dermatiologist she can follow up with

## 2018-11-14 NOTE — ED PROVIDER NOTE - CHPI ED SYMPTOMS NEG
no pain/no vomiting/no bruising/no chills/no fever/no scaly patches on skin/no inflammation/no petechia

## 2018-11-14 NOTE — ED ADULT NURSE NOTE - CHPI ED NUR SYMPTOMS NEG
no tingling/no pain/no weakness/no nausea/no fever/no vomiting/no dizziness/no chills/no decreased eating/drinking

## 2018-11-14 NOTE — ED STATDOCS - OBJECTIVE STATEMENT
Telemedicine assessment was conducted (using real time 2 way audio-video technology) by Dr. Artur Paul located at [address] [place] [address]  +++++++++++++++++++++++++++++++++++++++++++++++++++  History and Plan: 49 y/o F with PMHx of fibroids presents to the ED with complaints of rash and itchiness to the soles of her feet and palms of her hands x 3 days. Patient reports taking Benadryl (every 4 hours) and Zyrtec everyday. Patient reports difficulty swallowing described as heaviness sensation. Patient reports hives to the areas which are improved with medications. Patient is 3 weeks s/p liposuction and completed course of antibiotics. Allergies: Motrin and NSAIDs.   Plan: Will give Benadryl and steroids. Patient seen by me in intake for initial assessment and ordering. Physician on site to follow results and further evaluate and treat patient.

## 2018-11-14 NOTE — ED PROVIDER NOTE - PROGRESS NOTE DETAILS
pt asking for labs to check liver function and wants STD testing to see if contiriubuting to itching

## 2018-11-14 NOTE — ED ADULT NURSE NOTE - NSIMPLEMENTINTERV_GEN_ALL_ED
Implemented All Universal Safety Interventions:  [place] to call system. Call bell, personal items and telephone within reach. Instruct patient to call for assistance. Room bathroom lighting operational. Non-slip footwear when patient is off stretcher. Physically safe environment: no spills, clutter or unnecessary equipment. Stretcher in lowest position, wheels locked, appropriate side rails in place.

## 2018-11-15 LAB — T PALLIDUM AB TITR SER: NEGATIVE — SIGNIFICANT CHANGE UP

## 2018-11-16 LAB
C TRACH RRNA SPEC QL NAA+PROBE: SIGNIFICANT CHANGE UP
N GONORRHOEA RRNA SPEC QL NAA+PROBE: SIGNIFICANT CHANGE UP
SPECIMEN SOURCE: SIGNIFICANT CHANGE UP

## 2019-10-25 NOTE — ED PROVIDER NOTE - CPE EDP NEURO NORM
What Type Of Note Output Would You Prefer (Optional)?: Bullet Format Hpi Title: Evaluation of Skin Lesions normal...

## 2019-12-30 ENCOUNTER — EMERGENCY (EMERGENCY)
Facility: HOSPITAL | Age: 49
LOS: 1 days | Discharge: HOME | End: 2019-12-30
Attending: EMERGENCY MEDICINE
Payer: COMMERCIAL

## 2019-12-30 VITALS
TEMPERATURE: 98 F | DIASTOLIC BLOOD PRESSURE: 72 MMHG | WEIGHT: 147.05 LBS | OXYGEN SATURATION: 99 % | HEART RATE: 77 BPM | RESPIRATION RATE: 18 BRPM | SYSTOLIC BLOOD PRESSURE: 108 MMHG | HEIGHT: 59 IN

## 2019-12-30 VITALS
OXYGEN SATURATION: 99 % | HEART RATE: 79 BPM | SYSTOLIC BLOOD PRESSURE: 115 MMHG | RESPIRATION RATE: 17 BRPM | DIASTOLIC BLOOD PRESSURE: 69 MMHG | TEMPERATURE: 98 F

## 2019-12-30 DIAGNOSIS — Z98.890 OTHER SPECIFIED POSTPROCEDURAL STATES: Chronic | ICD-10-CM

## 2019-12-30 DIAGNOSIS — D36.9 BENIGN NEOPLASM, UNSPECIFIED SITE: Chronic | ICD-10-CM

## 2019-12-30 LAB
ALBUMIN SERPL ELPH-MCNC: 3.6 G/DL — SIGNIFICANT CHANGE UP (ref 3.5–5)
ALP SERPL-CCNC: 53 U/L — SIGNIFICANT CHANGE UP (ref 40–120)
ALT FLD-CCNC: 18 U/L DA — SIGNIFICANT CHANGE UP (ref 10–60)
ANION GAP SERPL CALC-SCNC: 4 MMOL/L — LOW (ref 5–17)
APTT BLD: 31.8 SEC — SIGNIFICANT CHANGE UP (ref 27.5–36.3)
AST SERPL-CCNC: 14 U/L — SIGNIFICANT CHANGE UP (ref 10–40)
BILIRUB SERPL-MCNC: 0.2 MG/DL — SIGNIFICANT CHANGE UP (ref 0.2–1.2)
BUN SERPL-MCNC: 23 MG/DL — HIGH (ref 7–18)
CALCIUM SERPL-MCNC: 9.4 MG/DL — SIGNIFICANT CHANGE UP (ref 8.4–10.5)
CHLORIDE SERPL-SCNC: 107 MMOL/L — SIGNIFICANT CHANGE UP (ref 96–108)
CO2 SERPL-SCNC: 28 MMOL/L — SIGNIFICANT CHANGE UP (ref 22–31)
CREAT SERPL-MCNC: 0.96 MG/DL — SIGNIFICANT CHANGE UP (ref 0.5–1.3)
GLUCOSE SERPL-MCNC: 89 MG/DL — SIGNIFICANT CHANGE UP (ref 70–99)
HCT VFR BLD CALC: 38.1 % — SIGNIFICANT CHANGE UP (ref 34.5–45)
HGB BLD-MCNC: 12 G/DL — SIGNIFICANT CHANGE UP (ref 11.5–15.5)
INR BLD: 0.99 RATIO — SIGNIFICANT CHANGE UP (ref 0.88–1.16)
MCHC RBC-ENTMCNC: 28.4 PG — SIGNIFICANT CHANGE UP (ref 27–34)
MCHC RBC-ENTMCNC: 31.5 GM/DL — LOW (ref 32–36)
MCV RBC AUTO: 90.3 FL — SIGNIFICANT CHANGE UP (ref 80–100)
NRBC # BLD: 0 /100 WBCS — SIGNIFICANT CHANGE UP (ref 0–0)
PLATELET # BLD AUTO: 289 K/UL — SIGNIFICANT CHANGE UP (ref 150–400)
POTASSIUM SERPL-MCNC: 4.1 MMOL/L — SIGNIFICANT CHANGE UP (ref 3.5–5.3)
POTASSIUM SERPL-SCNC: 4.1 MMOL/L — SIGNIFICANT CHANGE UP (ref 3.5–5.3)
PROT SERPL-MCNC: 7.4 G/DL — SIGNIFICANT CHANGE UP (ref 6–8.3)
PROTHROM AB SERPL-ACNC: 11 SEC — SIGNIFICANT CHANGE UP (ref 10–12.9)
RBC # BLD: 4.22 M/UL — SIGNIFICANT CHANGE UP (ref 3.8–5.2)
RBC # FLD: 11.7 % — SIGNIFICANT CHANGE UP (ref 10.3–14.5)
SODIUM SERPL-SCNC: 139 MMOL/L — SIGNIFICANT CHANGE UP (ref 135–145)
TROPONIN I SERPL-MCNC: <0.015 NG/ML — SIGNIFICANT CHANGE UP (ref 0–0.04)
WBC # BLD: 5.03 K/UL — SIGNIFICANT CHANGE UP (ref 3.8–10.5)
WBC # FLD AUTO: 5.03 K/UL — SIGNIFICANT CHANGE UP (ref 3.8–10.5)

## 2019-12-30 PROCEDURE — 80053 COMPREHEN METABOLIC PANEL: CPT

## 2019-12-30 PROCEDURE — 85610 PROTHROMBIN TIME: CPT

## 2019-12-30 PROCEDURE — 99283 EMERGENCY DEPT VISIT LOW MDM: CPT | Mod: 25

## 2019-12-30 PROCEDURE — 85027 COMPLETE CBC AUTOMATED: CPT

## 2019-12-30 PROCEDURE — 93005 ELECTROCARDIOGRAM TRACING: CPT

## 2019-12-30 PROCEDURE — 71045 X-RAY EXAM CHEST 1 VIEW: CPT

## 2019-12-30 PROCEDURE — 85730 THROMBOPLASTIN TIME PARTIAL: CPT

## 2019-12-30 PROCEDURE — 99285 EMERGENCY DEPT VISIT HI MDM: CPT

## 2019-12-30 PROCEDURE — 84484 ASSAY OF TROPONIN QUANT: CPT

## 2019-12-30 PROCEDURE — 36415 COLL VENOUS BLD VENIPUNCTURE: CPT

## 2019-12-30 PROCEDURE — 71045 X-RAY EXAM CHEST 1 VIEW: CPT | Mod: 26

## 2019-12-30 NOTE — ED PROVIDER NOTE - OBJECTIVE STATEMENT
48 y/o F patient presents to the ED w/ chest pain that began last night (12/29/2019) and worsened today (12/30/2019) associated w/ L arm tingling radiating down to the L finger tips and L face swelling today (12/30/2019). Patient endorses she has seen her cardiologist within the year w/ a normal Stress Test. Allergies: Motrin: Rash, NSAIDs: Swelling.

## 2019-12-30 NOTE — ED ADULT TRIAGE NOTE - CHIEF COMPLAINT QUOTE
chest pain since yesterday and left arm numbness since 1300 pm and left facial numbness [id number] 1300 pm

## 2019-12-30 NOTE — ED PROVIDER NOTE - NS ED ATTENDING STATEMENT MOD
Kaden Jones Patient Age: 18 year old  MESSAGE:   Received [facility] testing results back. Placed in scan bin (can find in external record 01/01/19). Copy made and placed in Dr. Daniel núñez.     Fwd to Dr. Sanchez to review and advise.      WEIGHT AND HEIGHT:   Wt Readings from Last 1 Encounters:   06/28/19 64 kg (141 lb) (49 %, Z= -0.02)*     * Growth percentiles are based on CDC (Boys, 2-20 Years) data.     Ht Readings from Last 1 Encounters:   06/28/19 5' 7\" (1.702 m) (25 %, Z= -0.67)*     * Growth percentiles are based on CDC (Boys, 2-20 Years) data.     BMI Readings from Last 1 Encounters:   06/28/19 22.08 kg/m² (62 %, Z= 0.31)*     * Growth percentiles are based on CDC (Boys, 2-20 Years) data.       ALLERGIES: no known allergies.  Current Outpatient Medications   Medication   • albuterol 108 (90 Base) MCG/ACT inhaler   • cetirizine (ZYRTEC) 5 MG tablet   • mometasone (NASONEX) 50 MCG/ACT nasal spray     No current facility-administered medications for this visit.      PHARMACY to use:           Pharmacy preference(s) on file: No Pharmacies Listed    CALL BACK INFO: DO NOT LEAVE A MESSAGE - contact patient directly  ROUTING: Patient's physician/staff        PCP: Jardo Stearns MD         INS: Payor: SHOBHA / Plan: OPEN ACCESS [id number] / Product Type: POS MISC   PATIENT ADDRESS:  [address] [place] [id number]   Attending Only

## 2019-12-30 NOTE — ED PROVIDER NOTE - CLINICAL SUMMARY MEDICAL DECISION MAKING FREE TEXT BOX
48 y/o F patient presents to the ED w/ atypical chest pain since yesterday (12/29/2019). Patient had normal Stress Test within the year. EKG normal, labs normal, and will discharge to followup w/ Cardiologist.

## 2019-12-30 NOTE — ED PROVIDER NOTE - PATIENT PORTAL LINK FT
You can access the FollowMyHealth Patient Portal offered by [facility] by registering at the following website: [email or web address]. By joining [facility]’s FollowMyHealth portal, you will also be able to view your health information using other applications (apps) compatible with our system.

## 2019-12-30 NOTE — ED ADULT NURSE NOTE - OBJECTIVE STATEMENT
Patient present to Ed with c/oleft side chest pain wih numbness and tingling to B/L fingers and face. As per patient face has resolved since yesterday on pain scale 10/10 constant and sharp denies taking any meds

## 2019-12-30 NOTE — ED ADULT NURSE NOTE - CHPI ED NUR SYMPTOMS NEG
no shortness of breath/no chills/no diaphoresis/no fever/no syncope/no nausea/no back pain/no congestion

## 2020-03-10 ENCOUNTER — EMERGENCY (EMERGENCY)
Facility: HOSPITAL | Age: 50
LOS: 1 days | Discharge: HOME | End: 2020-03-10
Attending: EMERGENCY MEDICINE
Payer: COMMERCIAL

## 2020-03-10 VITALS
OXYGEN SATURATION: 100 % | HEIGHT: 59 IN | RESPIRATION RATE: 18 BRPM | HEART RATE: 70 BPM | SYSTOLIC BLOOD PRESSURE: 103 MMHG | WEIGHT: 149.91 LBS | DIASTOLIC BLOOD PRESSURE: 69 MMHG | TEMPERATURE: 99 F

## 2020-03-10 DIAGNOSIS — Z98.890 OTHER SPECIFIED POSTPROCEDURAL STATES: Chronic | ICD-10-CM

## 2020-03-10 DIAGNOSIS — D36.9 BENIGN NEOPLASM, UNSPECIFIED SITE: Chronic | ICD-10-CM

## 2020-03-10 PROCEDURE — 99283 EMERGENCY DEPT VISIT LOW MDM: CPT | Mod: 25

## 2020-03-10 PROCEDURE — 71046 X-RAY EXAM CHEST 2 VIEWS: CPT | Mod: 26

## 2020-03-10 PROCEDURE — 71046 X-RAY EXAM CHEST 2 VIEWS: CPT

## 2020-03-10 PROCEDURE — 99283 EMERGENCY DEPT VISIT LOW MDM: CPT

## 2020-03-10 NOTE — ED PROVIDER NOTE - CLINICAL SUMMARY MEDICAL DECISION MAKING FREE TEXT BOX
Patient with viral like symptoms. Obtain chest X-ray and reassess. Patient with viral like symptoms. Obtain chest X-ray and reassess.  uri

## 2020-03-10 NOTE — ED PROVIDER NOTE - OBJECTIVE STATEMENT
50 y/o F patient with a significant PMHx of Fibroids and a significant PSHx of Dermoid cyst, Hemorrhoidectomy, and myomectomy presents to the ED with cold symptoms. Patient reports she was at work when she began having a runny nose, sneezing, coughing, and diaphoresis. Patient says she presented here to r/o COVID-[place]. Patient notes she traveled to [place] in February. Patient also endorses generalized weakness and myalgia. Patient denies any other complaints. NKDA.

## 2020-03-10 NOTE — ED PROVIDER NOTE - PATIENT PORTAL LINK FT
You can access the FollowMyHealth Patient Portal offered by [facility] by registering at the following website: [email or web address]. By joining [employer]’s FollowMyHealth portal, you will also be able to view your health information using other applications (apps) compatible with our system.

## 2021-12-03 NOTE — ED ADULT NURSE NOTE - TEMPLATE LIST FOR HEAD TO TOE ASSESSMENT
Cardiac Cephalexin Counseling: I counseled the patient regarding use of cephalexin as an antibiotic for prophylactic and/or therapeutic purposes. Cephalexin (commonly prescribed under brand name Keflex) is a cephalosporin antibiotic which is active against numerous classes of bacteria, including most skin bacteria. Side effects may include nausea, diarrhea, gastrointestinal upset, rash, hives, yeast infections, and in rare cases, hepatitis, kidney disease, seizures, fever, confusion, neurologic symptoms, and others. Patients with severe allergies to penicillin medications are cautioned that there is about a 10% incidence of cross-reactivity with cephalosporins. When possible, patients with penicillin allergies should use alternatives to cephalosporins for antibiotic therapy.

## 2022-09-08 NOTE — ASU PATIENT PROFILE, ADULT - NS PRO LAST MENSTRUAL
[de-identified] : Once the patient was seen and examined.  She has been followed by Dr. Beard for drain and wound care.  She states that she is feeling better since the operation.  On exam wound edges appear to be healing well and there is no evidence of recurrence.  She was advised to follow-up with Dr. Beard for follow-up and to come back as needed.  Did discuss that there is still some chance that the hernia could come back.  She is understandable.
6/18/17

## 2022-12-20 NOTE — ED PROVIDER NOTE - CHPI ED SYMPTOMS NEG
no fever/no chills
pyelonephritis
Clofazimine Counseling:  I discussed with the patient the risks of clofazimine including but not limited to skin and eye pigmentation, liver damage, nausea/vomiting, gastrointestinal bleeding and allergy.

## 2023-04-10 NOTE — ED ADULT NURSE NOTE - CCCP TRG CHIEF CMPLNT
itchiness on hands and feet since Sunday, having difficulty swallowing with pain today. Show Aperture Variable?: Yes Number Of Freeze-Thaw Cycles: 2 freeze-thaw cycles Post-Care Instructions: I reviewed with the patient in detail post-care instructions. Patient is to wear sunprotection, and avoid picking at any of the treated lesions. Pt may apply Vaseline to crusted or scabbing areas. Include Z78.9 (Other Specified Conditions Influencing Health Status) As An Associated Diagnosis?: No Application Tool (Optional): Liquid Nitrogen Sprayer Duration Of Freeze Thaw-Cycle (Seconds): 10-15 Detail Level: Detailed Consent: The patient's consent was obtained including but not limited to risks of crusting, scabbing, blistering, scarring, darker or lighter pigmentary change, recurrence, incomplete removal and infection. Medical Necessity Information: It is in your best interest to select a reason for this procedure from the list below. All of these items fulfill various CMS LCD requirements except the new and changing color options. Medical Necessity Clause: This procedure was medically necessary because the lesions that were treated were: painful and irritated Spray Paint Text: The liquid nitrogen was applied to the skin utilizing a spray paint frosting technique. Duration Of Freeze Thaw-Cycle (Seconds): 5

## 2023-09-13 NOTE — ED PROVIDER NOTE - PSYCHIATRIC, MLM
2023       Dane Hanson MD  [address]  [facility] [address]  Via In Basket      Patient: Stephanie Wells   YOB: 1955   Date of Visit: 2023       Dear Dr. Hanson:    I saw your patient, Stephanie Wells, for an evaluation. Below are my notes for this visit with her.    If you have questions, please do not hesitate to call me.      Sincerely,        Jackie Guzmán MD        CC: No Recipients  Jackie Guzmán MD  2023  8:43 PM  Sign when Signing Visit  Gynecologic Oncology Follow Up Virtual Visit    Date of Service: 2023    Patient Name: Stephanie Wells  : 1955  MRN: [id number]    Referring Provider: Jackie Reid MD    Primary Care Physician: Dane Hanson MD    Chief complaint: MARCY 3 with small residual cervix and history of lichen sclerosus    Subjective: A virtual visit was conducted via video over Zoom with the patient's consent. Patient has no new complaints.     Patient is willing to accept blood transfusion if necessary.     Objective:  General appearance: Well-developed, well nourished, alert, and in no acute distress  Neuro/psych: Patient without gross neurologic defect. Mood and affect appropriate.    Labs/Imaging/Other Diagnostic Studies:  Pap smear (23)  Pending    MRI pelvis w/ w/o constrast (23)  Normal low T2 signal intensity in the cervix without evidence for  focal high T2 signal intensity or abnormal contrast enhancement within the  cervix to suggest a focal cervical mass lesion. There is a 0.7 cm focal  nodular enhancement within the right posterolateral endometrial canal near  the fundus as seen on axial image 61 of series 13 and image 55 of series  14. Ovaries appear unremarkable. No free fluid in the pelvis. No local  lymphadenopathy identified.     IMPRESSION:  1. No focal cervical mass lesion identified.  2. Note is made of a 0.7 cm enhancing nodule within the endometrial cavity  near the fundus which could  represent an endometrial polyp and further  evaluation is recommended.      Assessment/Plan:  MARCY 3  Tobacco abuse  Lichen sclerosus    We reviewed the patient's recent MRI results and the actual images. Patient has a very small residual cervix with effaced vaginal fornices. I let her know that I do not think it is safely feasible to perform another LEEP or cold knife conization as the next step in management of her MARCY 3. I instead recommended proceeding with robotic hysterectomy. Pt is in agreement with the plan. Repeat colposcopy will be done in the OR immediately preceding the hysterectomy, and any additional vaginal or vulvar lesions will be biopsied. Opportunistic salpingectomy will also be performed.     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 She states she has not had a cigarette since Sunday and is interested in quitting for good.    We reviewed the risks of surgery, including but not limited to: bleeding, infection, venous thromboembolism, injury to other organs or tissues within the operative field possibly requiring additional procedures or more extensive surgery, incisional hernia formation, and anesthesia or medical complications. The possibility of identifying an occult cancer with the need for additional treatment was also discussed.      I let the patient know that it may be necessary to convert to laparotomy should it not be safe or feasible to continue the surgery in a minimally invasive manner for whatever reason.    The patient's hematologist, Dr. Salcido was contacted via Epic and stated that the patient does not require a Lovenox bridge. She can instead stop her Eliquis 2-3 days before surgery and resume it 24-48 days after. He also stated it would be okay for the patient to undergo a short course of low-dose vaginal  estrogen postoperatively once she has resumed her anticoagulation. I recommended vaginal estrogen to assist with her vaginal cuff healing and potentially help with her lichen sclerosus. Pt was advised to increase her clobetasol use for this. Vulvar biopsy was also discussed.    Surgery has tentatively been scheduled for early November. Patient was informed that she will need to obtain preoperative clearance from her primary care physician. An EKG was ordered. An order was also placed for the HPV vaccine to be administered postoperatively based on recent evidence suggesting decreased risk of recurrence in women with invasive cervical or vulvar cancer or HSIL of the vulva or cervix who received the vaccine adjuvantly.    A total of 30 minutes were spent on this encounter with over half the time on counseling and/or coordination of care.      Jackie Guzmán MD  Gynecologic Oncology  [facility]   Alert and oriented to person, place, time/situation. normal mood and affect. no apparent risk to self or others.

## 2025-07-01 NOTE — ED ADULT TRIAGE NOTE - TEMPERATURE IN CELSIUS (DEGREES C)
"  OB INTAKE INTERVIEW  Patient is 36 y.o. who presents for OB intake at 10 wks 4 days  She is accompanied by herself during this encounter  The father of her baby (Sandeep Burns) is involved in the pregnancy and is 35 years old.      Last Menstrual Period: end 2025 (approx)  Ultrasound: Measured 6 weeks 1 days on 2025, follow up US 2025 c/w 7 wk 2 days, US 2026 c/w 8 wk 4 days  Estimated Date of Delivery: 2025 confirmed by US    Signs/Symptoms of Pregnancy  Current pregnancy symptoms: sl nausea, sl urinary frequency, fatigue  Constipation no (having more liquid stools past 3-4 wk q other day)  Headaches no  Cramping/spotting YES (spotting, red on 2025 - \"2 gushes\" then with wiping x 6 hrs duration) - will have US in office today (Dr Horne)  PICA cravings no    Diabetes-  There is no height or weight on file to calculate BMI.  If patient has 1 or more, please order early 1 hour GTT  History of GDM no  BMI >35 YES  History of PCOS or current metformin use (should stop for 7 days prior to 1hr GTT unless pre-existing diabetes)  YES  History of LGA/macrosomic infant (4000g/9lbs) no    If patient has 2 or more, please order early 1 hour GTT  BMI>30 YES  AMA YES  First degree relative with type 2 diabetes no  History of chronic HTN, hyperlipidemia, elevated A1C no  High risk race (, , ,  or ) no    Hypertension- if you answer yes to any of the following, please order baseline preeclampsia labs (cbc, comprehensive metabolic panel, urine protein creatinine ratio, uric acid)  History of of chronic HTN no  History of gestational HTN no  History of preeclampsia, eclampsia, or HELLP syndrome no  History of diabetes no  History of lupus,sjogrens syndrome, kidney disease no    Thyroid- if yes order TSH with reflex T4  History of thyroid disease YES    Bleeding Disorder or Hx of DVT-patient or first degree relative with history of. Order the " following if not done previously.   (Factor V, antithrombin III, prothrombin gene mutation, protein C and S Ag, lupus anticoagulant, anticardiolipin, beta-2 glycoprotein)   no    OB/GYN-  History of abnormal pap smear no       Date of last pap smear 2021 - wnl (-) hpv  History of HPV no  History of Herpes/HSV no  History of other STI (gonorrhea, chlamydia, trich) no  History of prior  YES  History of prior  no  History of  delivery prior to 36 weeks 6 days no  History of Varicella or Vaccination patient had chickenpox in childhood  History of blood transfusion no  Ok for blood transfusion YES    Substance screening-   History of tobacco use YES  Currently using tobacco no  Substance Use Screen Level (N/A, LOW, HIGH) N/A    MRSA Screening-   Does the pt have a hx of MRSA? no    Immunizations:  Influenza vaccine given this season NO - recommended in pregnancy  Discussed Tdap vaccine YES  Discussed COVID Vaccine YES - had Covid vaccine x 2, patient had (+) Covid x 1    Genetic/MFM-  Do you or your partner have a history of any of the following in yourselves or first degree relatives?  Cystic fibrosis no  Spinal muscular atrophy no  Hemoglobinopathy/Sickle Cell/Thalassemia no  Fragile X Intellectual Disability no    If yes, discuss Carrier Screening and recommend consultation with MFM/Genetic Counseling and place specific [facility] Referral for.    If no, discuss Carrier Screening being completed once in a lifetime as a standard of care lab test. Place orders for Cystic Fibrosis Gene Test (FUE019) and Spinal Muscular Atrophy DNA ([id number])  - previous (-) carrier screening    Appointment for Nuchal Translucency Ultrasound at [facility] scheduled for - referral to MFM placed today - discussed N/T US/NIPT/MSAFP      Interview education  [facility] Pregnancy Essentials Book reviewed, discussed and attached to their AVS YES    Nurse/Family Partnership- patient may qualify; referral placed NO    Prenatal lab work  scripts YES ([facility] preferred lab)  Extra labs ordered: 1 hr glucose      Aspirin/Preeclampsia Screen    Risk Level Risk Factor Recommendation   LOW Prior Uncomplicated full-term delivery no No Aspirin recommendation        MODERATE Nulliparity no Recommend low-dose aspirin if     BMI>30 YES 2 or more moderate risk factors    Family History Preeclampsia (mother/sister)UNKNOWN      35yr old or greater YES     Black Race, Concern for SDOH/Low Socioeconomic no     IVF Pregnancy  YES     Personal History Risks (low birth weight, prior adverse preg outcome, >10yr preg interval) no         HIGH History of Preeclampsia no Recommend low-dose aspirin if     Multifetal gestation no 1 or more high risk factors    Chronic HTN     Type 1 or 2 Diabetes no     Renal Disease no     Autoimmune Disease  no      Contraindications to ASA therapy:  NSAID/ ASA allergy: no  Nasal polyps: no  Asthma with history of ASA induced bronchospasm: no  Relative contraindications:  History of GI bleed: no  Active peptic ulcer disease: no  Severe hepatic dysfunction: no    Patient should be recommended to take ASA 162mg during this pregnancy from 12-36wks to lower her risk of preeclampsia: MODERATE RISK per screening protocol - discussed LDASA 162 mg from wk 12-36 - will follow up with provider next appointment.          The patient has a history now or in prior pregnancy notable for:    - 2nd pregnancy (IVF with donor sperm - [facility]) - 1st attempt  - hx SAVD 4/20/2024 @ 40 wk 1 day ([facility] - Dr Horne) - 7 lb 15.5 oz      Details that I feel the provider should be aware of:     - hx hypothyroid - on Levothyroxine -sees endocrine q 4-6 wks - has thyroid lab orders in chart from 6/2025  - hx PCOS  - hx postpartum anemia 1st pregnancy - FE supp 3rd trimester  - hx anxiety/depression - on Celexa 10 mg daily (managed by pcp) - prior counseling - EPDS today = 0   - recommended q 6 month dental cleanings = last cleaning approx 1.5 yrs ago  -  patient is a behavior analyst (part-time)  - plans travel to beach in summer  - no dietary restrictions - reviewed dietary recommendations in pregnancy    PN1 visit scheduled. The patient was oriented to our practice, the navigator role, reviewed delivering physicians and [facility] for Delivery. All questions were answered.    Interviewed by: SHAWNA Roberts RN    37.1